# Patient Record
Sex: FEMALE | Race: WHITE | NOT HISPANIC OR LATINO | Employment: PART TIME | URBAN - NONMETROPOLITAN AREA
[De-identification: names, ages, dates, MRNs, and addresses within clinical notes are randomized per-mention and may not be internally consistent; named-entity substitution may affect disease eponyms.]

---

## 2017-05-02 ENCOUNTER — APPOINTMENT (OUTPATIENT)
Dept: LAB | Facility: HOSPITAL | Age: 32
End: 2017-05-02

## 2017-05-02 ENCOUNTER — OFFICE VISIT (OUTPATIENT)
Dept: OBSTETRICS AND GYNECOLOGY | Facility: CLINIC | Age: 32
End: 2017-05-02

## 2017-05-02 VITALS
WEIGHT: 148 LBS | SYSTOLIC BLOOD PRESSURE: 129 MMHG | DIASTOLIC BLOOD PRESSURE: 76 MMHG | HEIGHT: 62 IN | BODY MASS INDEX: 27.23 KG/M2 | HEART RATE: 72 BPM

## 2017-05-02 DIAGNOSIS — Z01.411 ENCOUNTER FOR GYNECOLOGICAL EXAMINATION WITH ABNORMAL FINDING: Primary | ICD-10-CM

## 2017-05-02 DIAGNOSIS — N91.5 OLIGOMENORRHEA: ICD-10-CM

## 2017-05-02 DIAGNOSIS — Z31.69 GENERAL COUNSELING AND ADVICE FOR PROCREATIVE MANAGEMENT: ICD-10-CM

## 2017-05-02 LAB
ALBUMIN SERPL-MCNC: 4.3 G/DL (ref 3.4–4.8)
ALBUMIN/GLOB SERPL: 1.4 G/DL (ref 1.1–1.8)
ALP SERPL-CCNC: 98 U/L (ref 38–126)
ALT SERPL W P-5'-P-CCNC: 32 U/L (ref 9–52)
ANION GAP SERPL CALCULATED.3IONS-SCNC: 12 MMOL/L (ref 5–15)
AST SERPL-CCNC: 23 U/L (ref 14–36)
BILIRUB SERPL-MCNC: 0.4 MG/DL (ref 0.2–1.3)
BUN BLD-MCNC: 13 MG/DL (ref 7–21)
BUN/CREAT SERPL: 19.7 (ref 7–25)
CALCIUM SPEC-SCNC: 9.2 MG/DL (ref 8.4–10.2)
CHLORIDE SERPL-SCNC: 103 MMOL/L (ref 95–110)
CO2 SERPL-SCNC: 24 MMOL/L (ref 22–31)
CREAT BLD-MCNC: 0.66 MG/DL (ref 0.5–1)
DEPRECATED RDW RBC AUTO: 38.5 FL (ref 36.4–46.3)
ERYTHROCYTE [DISTWIDTH] IN BLOOD BY AUTOMATED COUNT: 12.7 % (ref 11.5–14.5)
FSH SERPL-ACNC: 4.9 MIU/ML
GFR SERPL CREATININE-BSD FRML MDRD: 104 ML/MIN/1.73 (ref 64–149)
GLOBULIN UR ELPH-MCNC: 3.1 GM/DL (ref 2.3–3.5)
GLUCOSE BLD-MCNC: 92 MG/DL (ref 60–100)
HBA1C MFR BLD: 5.69 % (ref 4–5.6)
HCT VFR BLD AUTO: 41.3 % (ref 35–45)
HGB BLD-MCNC: 14.2 G/DL (ref 12–15.5)
LH SERPL-ACNC: 4.79 MIU/ML
MCH RBC QN AUTO: 28.9 PG (ref 26.5–34)
MCHC RBC AUTO-ENTMCNC: 34.4 G/DL (ref 31.4–36)
MCV RBC AUTO: 84.1 FL (ref 80–98)
PLATELET # BLD AUTO: 317 10*3/MM3 (ref 150–450)
PMV BLD AUTO: 10.2 FL (ref 8–12)
POTASSIUM BLD-SCNC: 4 MMOL/L (ref 3.5–5.1)
PROT SERPL-MCNC: 7.4 G/DL (ref 6.3–8.6)
RBC # BLD AUTO: 4.91 10*6/MM3 (ref 3.77–5.16)
SODIUM BLD-SCNC: 139 MMOL/L (ref 137–145)
T3 SERPL-MCNC: 151 NG/DL (ref 97–169)
T4 FREE SERPL-MCNC: 0.95 NG/DL (ref 0.78–2.19)
TSH SERPL DL<=0.05 MIU/L-ACNC: 1.2 MIU/ML (ref 0.46–4.68)
WBC NRBC COR # BLD: 9.19 10*3/MM3 (ref 3.2–9.8)

## 2017-05-02 PROCEDURE — 83036 HEMOGLOBIN GLYCOSYLATED A1C: CPT | Performed by: NURSE PRACTITIONER

## 2017-05-02 PROCEDURE — 84144 ASSAY OF PROGESTERONE: CPT | Performed by: NURSE PRACTITIONER

## 2017-05-02 PROCEDURE — 36415 COLL VENOUS BLD VENIPUNCTURE: CPT | Performed by: NURSE PRACTITIONER

## 2017-05-02 PROCEDURE — 83525 ASSAY OF INSULIN: CPT | Performed by: NURSE PRACTITIONER

## 2017-05-02 PROCEDURE — 80053 COMPREHEN METABOLIC PANEL: CPT | Performed by: NURSE PRACTITIONER

## 2017-05-02 PROCEDURE — 84443 ASSAY THYROID STIM HORMONE: CPT | Performed by: NURSE PRACTITIONER

## 2017-05-02 PROCEDURE — 84480 ASSAY TRIIODOTHYRONINE (T3): CPT | Performed by: NURSE PRACTITIONER

## 2017-05-02 PROCEDURE — 87624 HPV HI-RISK TYP POOLED RSLT: CPT | Performed by: NURSE PRACTITIONER

## 2017-05-02 PROCEDURE — 85027 COMPLETE CBC AUTOMATED: CPT | Performed by: NURSE PRACTITIONER

## 2017-05-02 PROCEDURE — 84439 ASSAY OF FREE THYROXINE: CPT | Performed by: NURSE PRACTITIONER

## 2017-05-02 PROCEDURE — 83002 ASSAY OF GONADOTROPIN (LH): CPT | Performed by: NURSE PRACTITIONER

## 2017-05-02 PROCEDURE — 99385 PREV VISIT NEW AGE 18-39: CPT | Performed by: NURSE PRACTITIONER

## 2017-05-02 PROCEDURE — 88142 CYTOPATH C/V THIN LAYER: CPT | Performed by: NURSE PRACTITIONER

## 2017-05-02 PROCEDURE — 83001 ASSAY OF GONADOTROPIN (FSH): CPT | Performed by: NURSE PRACTITIONER

## 2017-05-02 RX ORDER — METFORMIN HYDROCHLORIDE 500 MG/1
500 TABLET, EXTENDED RELEASE ORAL
Qty: 30 TABLET | Refills: 6 | Status: SHIPPED | OUTPATIENT
Start: 2017-05-02 | End: 2017-06-08 | Stop reason: SDUPTHER

## 2017-05-03 ENCOUNTER — TELEPHONE (OUTPATIENT)
Dept: OBSTETRICS AND GYNECOLOGY | Facility: CLINIC | Age: 32
End: 2017-05-03

## 2017-05-03 LAB
INSULIN SERPL-ACNC: 11.6 UIU/ML (ref 2.6–24.9)
PROGEST SERPL-MCNC: <0.1 NG/ML

## 2017-05-03 RX ORDER — MEDROXYPROGESTERONE ACETATE 2.5 MG/1
TABLET ORAL
Qty: 12 TABLET | Refills: 3 | Status: SHIPPED | OUTPATIENT
Start: 2017-05-03 | End: 2020-03-03

## 2017-05-03 RX ORDER — MEDROXYPROGESTERONE ACETATE 10 MG/1
10 TABLET ORAL DAILY
Qty: 10 TABLET | Refills: 3 | Status: SHIPPED | OUTPATIENT
Start: 2017-05-03 | End: 2020-03-03

## 2017-05-05 LAB
LAB AP CASE REPORT: NORMAL
LAB AP GYN ADDITIONAL INFORMATION: NORMAL
LAB AP GYN OTHER FINDINGS: NORMAL
Lab: NORMAL
PATH INTERP SPEC-IMP: NORMAL
STAT OF ADQ CVX/VAG CYTO-IMP: NORMAL

## 2017-05-09 LAB — HPV I/H RISK 4 DNA CVX QL PROBE+SIG AMP: NEGATIVE

## 2017-06-08 RX ORDER — METFORMIN HYDROCHLORIDE 500 MG/1
500 TABLET, EXTENDED RELEASE ORAL 2 TIMES DAILY
Qty: 60 TABLET | Refills: 6 | Status: SHIPPED | OUTPATIENT
Start: 2017-06-08 | End: 2020-03-03

## 2017-06-30 RX ORDER — BENZONATATE 100 MG/1
100 CAPSULE ORAL 3 TIMES DAILY PRN
Qty: 30 CAPSULE | Refills: 0 | Status: SHIPPED | OUTPATIENT
Start: 2017-06-30 | End: 2017-08-01

## 2017-08-01 ENCOUNTER — OFFICE VISIT (OUTPATIENT)
Dept: FAMILY MEDICINE CLINIC | Facility: CLINIC | Age: 32
End: 2017-08-01

## 2017-08-01 VITALS
OXYGEN SATURATION: 98 % | RESPIRATION RATE: 16 BRPM | HEART RATE: 78 BPM | TEMPERATURE: 98.9 F | DIASTOLIC BLOOD PRESSURE: 78 MMHG | WEIGHT: 145 LBS | SYSTOLIC BLOOD PRESSURE: 118 MMHG | HEIGHT: 62 IN | BODY MASS INDEX: 26.68 KG/M2

## 2017-08-01 DIAGNOSIS — Z00.00 ROUTINE MEDICAL EXAM: Primary | ICD-10-CM

## 2017-08-01 PROCEDURE — 99395 PREV VISIT EST AGE 18-39: CPT | Performed by: NURSE PRACTITIONER

## 2017-08-01 NOTE — PROGRESS NOTES
Subjective   Rachel Salgado is a 32 y.o. female. Patient here today with complaints of School Physical  pt here today for college physical, denies complaints, recently had TB skin test which was negative and within the last year has had MMR, varicella, hep b, cbc and ua labs.     Vitals:    08/01/17 1041   BP: 118/78   Pulse: 78   Resp: 16   Temp: 98.9 °F (37.2 °C)   SpO2: 98%     Past Medical History:   Diagnosis Date   • Abnormal glucose tolerance test during pregnancy, not yet delivered     baby not yet delivered   • Absence of menstruation    • Cholestasis of parenteral nutrition    • Dietary counseling and surveillance    • Encounter for routine gynecological examination    • Need for prophylactic vaccination and inoculation against influenza    • Routine postpartum follow-up    • Tuberculosis screening    • Vaginitis and vulvovaginitis    • Visit for gynecologic examination      History of Present Illness     The following portions of the patient's history were reviewed and updated as appropriate: allergies, current medications, past family history, past medical history, past social history, past surgical history and problem list.    Review of Systems   Constitutional: Negative.    HENT: Negative.    Eyes: Negative.    Respiratory: Negative.    Cardiovascular: Negative.    Gastrointestinal: Negative.    Endocrine: Negative.    Genitourinary: Negative.    Musculoskeletal: Negative.    Skin: Negative.    Allergic/Immunologic: Negative.    Neurological: Negative.    Hematological: Negative.    Psychiatric/Behavioral: Negative.        Objective   Physical Exam   Constitutional: She is oriented to person, place, and time. Vital signs are normal. She appears well-developed and well-nourished. No distress.   HENT:   Head: Normocephalic and atraumatic.   Right Ear: External ear normal.   Left Ear: External ear normal.   Nose: Nose normal.   Mouth/Throat: Oropharynx is clear and moist. No oropharyngeal exudate.    Eyes: Conjunctivae and EOM are normal. Pupils are equal, round, and reactive to light. Right eye exhibits no discharge. Left eye exhibits no discharge. No scleral icterus.   Neck: Normal range of motion. Neck supple. No JVD present. No tracheal deviation present. No thyromegaly present.   Cardiovascular: Normal rate, regular rhythm, normal heart sounds and intact distal pulses.  Exam reveals no gallop and no friction rub.    No murmur heard.  Pulmonary/Chest: Effort normal and breath sounds normal. No stridor. No respiratory distress. She has no wheezes. She has no rales. She exhibits no tenderness.   Abdominal: Soft. Bowel sounds are normal. She exhibits no distension and no mass. There is no tenderness. There is no rebound and no guarding. No hernia.   Musculoskeletal: Normal range of motion. She exhibits no edema, tenderness or deformity.   Lymphadenopathy:     She has no cervical adenopathy.   Neurological: She is alert and oriented to person, place, and time. She has normal reflexes. She displays normal reflexes. No cranial nerve deficit. She exhibits normal muscle tone. Coordination normal.   Skin: Skin is warm and dry. No rash noted. She is not diaphoretic. No erythema. No pallor.   Psychiatric: She has a normal mood and affect. Her behavior is normal. Judgment and thought content normal.   Nursing note and vitals reviewed.      Assessment/Plan   Rachel was seen today for school physical.    Diagnoses and all orders for this visit:    Routine medical exam      Physical forms are completed, please copy of them in chart.  She is clear without restrictions to participate with her college classes.  Will return here when necessary problems or as scheduled for chronic conditions.  All questions and concerns are addressed with understanding noted. The patient is in agreement to above plan.

## 2017-08-29 ENCOUNTER — TELEPHONE (OUTPATIENT)
Dept: OBSTETRICS AND GYNECOLOGY | Facility: CLINIC | Age: 32
End: 2017-08-29

## 2017-08-29 NOTE — TELEPHONE ENCOUNTER
----- Message from Almaz Buckley sent at 8/29/2017  9:00 AM CDT -----  Contact: [phone]  PATIENT IS NEEDING ON HER CLOMID 100MG. SHE USES CLINIC PHARMACY IN [place]

## 2020-03-03 ENCOUNTER — OFFICE VISIT (OUTPATIENT)
Dept: OBSTETRICS AND GYNECOLOGY | Facility: CLINIC | Age: 35
End: 2020-03-03

## 2020-03-03 VITALS
DIASTOLIC BLOOD PRESSURE: 78 MMHG | HEART RATE: 77 BPM | HEIGHT: 62 IN | WEIGHT: 142.8 LBS | SYSTOLIC BLOOD PRESSURE: 126 MMHG | BODY MASS INDEX: 26.28 KG/M2

## 2020-03-03 DIAGNOSIS — N92.1 MENORRHAGIA WITH IRREGULAR CYCLE: Primary | ICD-10-CM

## 2020-03-03 DIAGNOSIS — Z12.31 SCREENING MAMMOGRAM, ENCOUNTER FOR: ICD-10-CM

## 2020-03-03 PROCEDURE — 99214 OFFICE O/P EST MOD 30 MIN: CPT | Performed by: NURSE PRACTITIONER

## 2020-03-03 RX ORDER — ONDANSETRON 4 MG/1
TABLET, ORALLY DISINTEGRATING ORAL
COMMUNITY
Start: 2020-02-07 | End: 2020-07-15

## 2020-03-04 NOTE — PROGRESS NOTES
"Subjective   Rahcel Salgado is a 34 y.o. female.     History of Present Illness   Pt presents with complaints of heavy, prolonged period. Pt has PCOS and often has amenorrhea for prolonged periods of time. She has failed numerous fertility measures and was told without a donor egg or embryo, there is not really a chance for her to conceive. Pt had bleeding in Oct (4 days, light to moderate, no cramping) and no bleeding again until 2/8/2020. She continues to bleed now but admits that it is lighter in the last couple of days. The first couple of weeks was excessive, \"pouring\". She denies any pelvic pain. Did pass clots when she was bleeding heavy. She has felt tired but denies palpitations, cold intolerance, SOA.     The following portions of the patient's history were reviewed and updated as appropriate: allergies, current medications, past family history, past medical history, past social history, past surgical history and problem list.    Review of Systems   Constitutional: Positive for fatigue. Negative for chills and fever.   Respiratory: Negative.  Negative for shortness of breath.    Cardiovascular: Negative.  Negative for palpitations.   Endocrine: Negative for cold intolerance and heat intolerance.   Genitourinary: Positive for menstrual problem. Negative for pelvic pain.   Neurological: Negative for dizziness, syncope and light-headedness.       Objective    Vitals:    03/03/20 1318   BP: 126/78   Pulse: 77         03/03/20  1318   Weight: 64.8 kg (142 lb 12.8 oz)     Body mass index is 26.12 kg/m².    Physical Exam   Constitutional: She is oriented to person, place, and time. She appears well-developed and well-nourished.   Cardiovascular: Normal rate, regular rhythm and normal heart sounds.   Pulmonary/Chest: Effort normal and breath sounds normal.   Neurological: She is alert and oriented to person, place, and time.   Skin: Skin is warm and dry. No pallor.   Psychiatric: She has a normal mood and " affect. Her behavior is normal.   Vitals reviewed.        Assessment/Plan   Rachel was seen today for menometrorrhagia.    Diagnoses and all orders for this visit:    Menorrhagia with irregular cycle  -     TSH  -     T4, Free  -     CBC Auto Differential  -     Iron Profile  -     Comprehensive Metabolic Panel  -     Vitamin B12  -     Vitamin D 25 Hydroxy    Screening mammogram, encounter for  -     Mammo Screening Digital Tomosynthesis Bilateral With CAD; Future    Obtain the labs listed above. Pt also interested in baseline screening MMG now that she is 35yo. I will place the order and pt to schedule this at a later date.     We discussed options of longer term mananagement with hormonal contraceptives, IUDs, etc. Vs short term management with a month or 2 of OCPs. Pt chooses 1 month of OCP then to monitor her bleeding pattern. I gave her a sample pack of Taytulla 1/20's. She has had to take provera for menses induction before. She agrees to taking provera every 3 months if she doesn't have a period on her own and UPT is negative. RTC in 3 months or sooner PRN. We will do a complete well woman in 3 months and follow up after a couple of months without OCP. We can discuss longer term options at that visit as well. Pt agrees with this plan of care.

## 2020-03-05 LAB
25(OH)D3 SERPL-MCNC: 32.5 NG/ML (ref 30–100)
ALBUMIN SERPL-MCNC: 4.3 G/DL (ref 3.5–5)
ALBUMIN/GLOB SERPL: 1.4 G/DL (ref 1.1–1.8)
ALP SERPL-CCNC: 87 U/L (ref 38–126)
ALT SERPL W P-5'-P-CCNC: 14 U/L
ANION GAP SERPL CALCULATED.3IONS-SCNC: 11 MMOL/L (ref 5–15)
AST SERPL-CCNC: 21 U/L (ref 14–36)
BASOPHILS # BLD AUTO: 0.01 10*3/MM3 (ref 0–0.2)
BASOPHILS NFR BLD AUTO: 0.2 % (ref 0–1.5)
BILIRUB SERPL-MCNC: 0.4 MG/DL (ref 0.2–1.3)
BUN BLD-MCNC: 14 MG/DL (ref 7–23)
BUN/CREAT SERPL: 17.3 (ref 7–25)
CALCIUM SPEC-SCNC: 9.3 MG/DL (ref 8.4–10.2)
CHLORIDE SERPL-SCNC: 106 MMOL/L (ref 101–112)
CO2 SERPL-SCNC: 26 MMOL/L (ref 22–30)
CREAT BLD-MCNC: 0.81 MG/DL (ref 0.52–1.04)
DEPRECATED RDW RBC AUTO: 41.4 FL (ref 37–54)
EOSINOPHIL # BLD AUTO: 0.21 10*3/MM3 (ref 0–0.4)
EOSINOPHIL NFR BLD AUTO: 3.2 % (ref 0.3–6.2)
ERYTHROCYTE [DISTWIDTH] IN BLOOD BY AUTOMATED COUNT: 12.8 % (ref 12.3–15.4)
GFR SERPL CREATININE-BSD FRML MDRD: 80 ML/MIN/1.73 (ref 64–149)
GLOBULIN UR ELPH-MCNC: 3.1 GM/DL (ref 2.3–3.5)
GLUCOSE BLD-MCNC: 86 MG/DL (ref 70–99)
HCT VFR BLD AUTO: 40.9 % (ref 34–46.6)
HGB BLD-MCNC: 13.3 G/DL (ref 12–15.9)
IRON 24H UR-MRATE: 124 MCG/DL (ref 37–145)
IRON SATN MFR SERPL: 34 % (ref 20–50)
LYMPHOCYTES # BLD AUTO: 1.99 10*3/MM3 (ref 0.7–3.1)
LYMPHOCYTES NFR BLD AUTO: 30.1 % (ref 19.6–45.3)
MCH RBC QN AUTO: 29.2 PG (ref 26.6–33)
MCHC RBC AUTO-ENTMCNC: 32.5 G/DL (ref 31.5–35.7)
MCV RBC AUTO: 89.7 FL (ref 79–97)
MONOCYTES # BLD AUTO: 0.4 10*3/MM3 (ref 0.1–0.9)
MONOCYTES NFR BLD AUTO: 6.1 % (ref 5–12)
NEUTROPHILS # BLD AUTO: 4 10*3/MM3 (ref 1.7–7)
NEUTROPHILS NFR BLD AUTO: 60.4 % (ref 42.7–76)
PLATELET # BLD AUTO: 287 10*3/MM3 (ref 140–450)
PMV BLD AUTO: 10 FL (ref 6–12)
POTASSIUM BLD-SCNC: 4 MMOL/L (ref 3.4–5)
PROT SERPL-MCNC: 7.4 G/DL (ref 6.3–8.6)
RBC # BLD AUTO: 4.56 10*6/MM3 (ref 3.77–5.28)
SODIUM BLD-SCNC: 143 MMOL/L (ref 137–145)
T4 FREE SERPL-MCNC: 1.14 NG/DL (ref 0.93–1.7)
TIBC SERPL-MCNC: 365 MCG/DL (ref 298–536)
TRANSFERRIN SERPL-MCNC: 245 MG/DL (ref 200–360)
TSH SERPL DL<=0.05 MIU/L-ACNC: 1.81 UIU/ML (ref 0.27–4.2)
VIT B12 BLD-MCNC: 681 PG/ML (ref 211–946)
WBC NRBC COR # BLD: 6.61 10*3/MM3 (ref 3.4–10.8)

## 2020-03-05 PROCEDURE — 84443 ASSAY THYROID STIM HORMONE: CPT | Performed by: NURSE PRACTITIONER

## 2020-03-05 PROCEDURE — 85025 COMPLETE CBC W/AUTO DIFF WBC: CPT | Performed by: NURSE PRACTITIONER

## 2020-03-05 PROCEDURE — 80053 COMPREHEN METABOLIC PANEL: CPT | Performed by: NURSE PRACTITIONER

## 2020-03-05 PROCEDURE — 83540 ASSAY OF IRON: CPT | Performed by: NURSE PRACTITIONER

## 2020-03-05 PROCEDURE — 36415 COLL VENOUS BLD VENIPUNCTURE: CPT | Performed by: NURSE PRACTITIONER

## 2020-03-05 PROCEDURE — 82306 VITAMIN D 25 HYDROXY: CPT | Performed by: NURSE PRACTITIONER

## 2020-03-05 PROCEDURE — 84466 ASSAY OF TRANSFERRIN: CPT | Performed by: NURSE PRACTITIONER

## 2020-03-05 PROCEDURE — 82607 VITAMIN B-12: CPT | Performed by: NURSE PRACTITIONER

## 2020-03-05 PROCEDURE — 84439 ASSAY OF FREE THYROXINE: CPT | Performed by: NURSE PRACTITIONER

## 2020-05-10 ENCOUNTER — APPOINTMENT (OUTPATIENT)
Dept: ULTRASOUND IMAGING | Facility: HOSPITAL | Age: 35
End: 2020-05-10

## 2020-05-10 ENCOUNTER — HOSPITAL ENCOUNTER (OUTPATIENT)
Facility: HOSPITAL | Age: 35
Discharge: HOME | End: 2020-05-11
Attending: EMERGENCY MEDICINE | Admitting: SURGERY

## 2020-05-10 DIAGNOSIS — K81.0 ACUTE CHOLECYSTITIS: Primary | ICD-10-CM

## 2020-05-10 LAB
ALBUMIN SERPL-MCNC: 4.5 G/DL (ref 3.5–5.2)
ALBUMIN/GLOB SERPL: 1.4 G/DL
ALP SERPL-CCNC: 101 U/L (ref 39–117)
ALT SERPL W P-5'-P-CCNC: 15 U/L (ref 1–33)
ANION GAP SERPL CALCULATED.3IONS-SCNC: 14 MMOL/L (ref 5–15)
AST SERPL-CCNC: 20 U/L (ref 1–32)
B-HCG UR QL: NEGATIVE
BACTERIA UR QL AUTO: ABNORMAL /HPF
BASOPHILS # BLD AUTO: 0.02 10*3/MM3 (ref 0–0.2)
BASOPHILS NFR BLD AUTO: 0.1 % (ref 0–1.5)
BILIRUB SERPL-MCNC: 0.2 MG/DL (ref 0.2–1.2)
BILIRUB UR QL STRIP: NEGATIVE
BUN BLD-MCNC: 13 MG/DL (ref 6–20)
BUN/CREAT SERPL: 15.9 (ref 7–25)
CALCIUM SPEC-SCNC: 9.5 MG/DL (ref 8.6–10.5)
CHLORIDE SERPL-SCNC: 104 MMOL/L (ref 98–107)
CLARITY UR: CLEAR
CO2 SERPL-SCNC: 22 MMOL/L (ref 22–29)
COLOR UR: YELLOW
CREAT BLD-MCNC: 0.82 MG/DL (ref 0.57–1)
DEPRECATED RDW RBC AUTO: 36.5 FL (ref 37–54)
EOSINOPHIL # BLD AUTO: 0.22 10*3/MM3 (ref 0–0.4)
EOSINOPHIL NFR BLD AUTO: 1.6 % (ref 0.3–6.2)
ERYTHROCYTE [DISTWIDTH] IN BLOOD BY AUTOMATED COUNT: 12 % (ref 12.3–15.4)
GFR SERPL CREATININE-BSD FRML MDRD: 79 ML/MIN/1.73
GLOBULIN UR ELPH-MCNC: 3.2 GM/DL
GLUCOSE BLD-MCNC: 124 MG/DL (ref 65–99)
GLUCOSE UR STRIP-MCNC: NEGATIVE MG/DL
HCT VFR BLD AUTO: 42.1 % (ref 34–46.6)
HGB BLD-MCNC: 14.3 G/DL (ref 12–15.9)
HGB UR QL STRIP.AUTO: NEGATIVE
HOLD SPECIMEN: NORMAL
HYALINE CASTS UR QL AUTO: ABNORMAL /LPF
IMM GRANULOCYTES # BLD AUTO: 0.05 10*3/MM3 (ref 0–0.05)
IMM GRANULOCYTES NFR BLD AUTO: 0.4 % (ref 0–0.5)
KETONES UR QL STRIP: NEGATIVE
LEUKOCYTE ESTERASE UR QL STRIP.AUTO: ABNORMAL
LIPASE SERPL-CCNC: 22 U/L (ref 13–60)
LYMPHOCYTES # BLD AUTO: 2.66 10*3/MM3 (ref 0.7–3.1)
LYMPHOCYTES NFR BLD AUTO: 19.4 % (ref 19.6–45.3)
MCH RBC QN AUTO: 28.5 PG (ref 26.6–33)
MCHC RBC AUTO-ENTMCNC: 34 G/DL (ref 31.5–35.7)
MCV RBC AUTO: 84 FL (ref 79–97)
MONOCYTES # BLD AUTO: 1.13 10*3/MM3 (ref 0.1–0.9)
MONOCYTES NFR BLD AUTO: 8.2 % (ref 5–12)
NEUTROPHILS # BLD AUTO: 9.65 10*3/MM3 (ref 1.7–7)
NEUTROPHILS NFR BLD AUTO: 70.3 % (ref 42.7–76)
NITRITE UR QL STRIP: NEGATIVE
NRBC BLD AUTO-RTO: 0 /100 WBC (ref 0–0.2)
PH UR STRIP.AUTO: 5.5 [PH] (ref 5–9)
PLATELET # BLD AUTO: 307 10*3/MM3 (ref 140–450)
PMV BLD AUTO: 10.3 FL (ref 6–12)
POTASSIUM BLD-SCNC: 4.1 MMOL/L (ref 3.5–5.2)
PROT SERPL-MCNC: 7.7 G/DL (ref 6–8.5)
PROT UR QL STRIP: NEGATIVE
RBC # BLD AUTO: 5.01 10*6/MM3 (ref 3.77–5.28)
RBC # UR: ABNORMAL /HPF
REF LAB TEST METHOD: ABNORMAL
SODIUM BLD-SCNC: 140 MMOL/L (ref 136–145)
SP GR UR STRIP: 1.02 (ref 1–1.03)
SQUAMOUS #/AREA URNS HPF: ABNORMAL /HPF
UROBILINOGEN UR QL STRIP: ABNORMAL
WBC NRBC COR # BLD: 13.73 10*3/MM3 (ref 3.4–10.8)
WBC UR QL AUTO: ABNORMAL /HPF
WHOLE BLOOD HOLD SPECIMEN: NORMAL

## 2020-05-10 PROCEDURE — 81025 URINE PREGNANCY TEST: CPT | Performed by: NURSE PRACTITIONER

## 2020-05-10 PROCEDURE — 96365 THER/PROPH/DIAG IV INF INIT: CPT

## 2020-05-10 PROCEDURE — 99284 EMERGENCY DEPT VISIT MOD MDM: CPT

## 2020-05-10 PROCEDURE — 85025 COMPLETE CBC W/AUTO DIFF WBC: CPT | Performed by: NURSE PRACTITIONER

## 2020-05-10 PROCEDURE — 25010000002 PIPERACILLIN-TAZOBACTAM: Performed by: NURSE PRACTITIONER

## 2020-05-10 PROCEDURE — 25010000002 KETOROLAC TROMETHAMINE PER 15 MG: Performed by: NURSE PRACTITIONER

## 2020-05-10 PROCEDURE — 83690 ASSAY OF LIPASE: CPT | Performed by: NURSE PRACTITIONER

## 2020-05-10 PROCEDURE — G0378 HOSPITAL OBSERVATION PER HR: HCPCS

## 2020-05-10 PROCEDURE — 96361 HYDRATE IV INFUSION ADD-ON: CPT

## 2020-05-10 PROCEDURE — 25010000002 ONDANSETRON PER 1 MG: Performed by: NURSE PRACTITIONER

## 2020-05-10 PROCEDURE — 96375 TX/PRO/DX INJ NEW DRUG ADDON: CPT

## 2020-05-10 PROCEDURE — 80053 COMPREHEN METABOLIC PANEL: CPT | Performed by: NURSE PRACTITIONER

## 2020-05-10 PROCEDURE — 76705 ECHO EXAM OF ABDOMEN: CPT

## 2020-05-10 PROCEDURE — 81001 URINALYSIS AUTO W/SCOPE: CPT | Performed by: EMERGENCY MEDICINE

## 2020-05-10 RX ORDER — ACETAMINOPHEN 650 MG/1
650 SUPPOSITORY RECTAL EVERY 4 HOURS PRN
Status: DISCONTINUED | OUTPATIENT
Start: 2020-05-10 | End: 2020-05-11 | Stop reason: HOSPADM

## 2020-05-10 RX ORDER — ACETAMINOPHEN 325 MG/1
650 TABLET ORAL EVERY 4 HOURS PRN
Status: DISCONTINUED | OUTPATIENT
Start: 2020-05-10 | End: 2020-05-11 | Stop reason: HOSPADM

## 2020-05-10 RX ORDER — SODIUM CHLORIDE 9 MG/ML
100 INJECTION, SOLUTION INTRAVENOUS CONTINUOUS
Status: DISCONTINUED | OUTPATIENT
Start: 2020-05-10 | End: 2020-05-11 | Stop reason: HOSPADM

## 2020-05-10 RX ORDER — ONDANSETRON 4 MG/1
4 TABLET, FILM COATED ORAL EVERY 6 HOURS PRN
Status: DISCONTINUED | OUTPATIENT
Start: 2020-05-10 | End: 2020-05-11 | Stop reason: HOSPADM

## 2020-05-10 RX ORDER — ACETAMINOPHEN 160 MG/5ML
650 SOLUTION ORAL EVERY 4 HOURS PRN
Status: DISCONTINUED | OUTPATIENT
Start: 2020-05-10 | End: 2020-05-10 | Stop reason: SDUPTHER

## 2020-05-10 RX ORDER — ONDANSETRON 2 MG/ML
4 INJECTION INTRAMUSCULAR; INTRAVENOUS EVERY 6 HOURS PRN
Status: DISCONTINUED | OUTPATIENT
Start: 2020-05-10 | End: 2020-05-11 | Stop reason: HOSPADM

## 2020-05-10 RX ORDER — SODIUM CHLORIDE 0.9 % (FLUSH) 0.9 %
10 SYRINGE (ML) INJECTION AS NEEDED
Status: DISCONTINUED | OUTPATIENT
Start: 2020-05-10 | End: 2020-05-11 | Stop reason: HOSPADM

## 2020-05-10 RX ORDER — ONDANSETRON 2 MG/ML
4 INJECTION INTRAMUSCULAR; INTRAVENOUS ONCE
Status: COMPLETED | OUTPATIENT
Start: 2020-05-10 | End: 2020-05-10

## 2020-05-10 RX ORDER — SODIUM CHLORIDE 0.9 % (FLUSH) 0.9 %
10 SYRINGE (ML) INJECTION EVERY 12 HOURS SCHEDULED
Status: DISCONTINUED | OUTPATIENT
Start: 2020-05-10 | End: 2020-05-11 | Stop reason: HOSPADM

## 2020-05-10 RX ORDER — KETOROLAC TROMETHAMINE 30 MG/ML
30 INJECTION, SOLUTION INTRAMUSCULAR; INTRAVENOUS ONCE
Status: COMPLETED | OUTPATIENT
Start: 2020-05-10 | End: 2020-05-10

## 2020-05-10 RX ORDER — NALOXONE HCL 0.4 MG/ML
0.4 VIAL (ML) INJECTION
Status: DISCONTINUED | OUTPATIENT
Start: 2020-05-10 | End: 2020-05-11 | Stop reason: HOSPADM

## 2020-05-10 RX ADMIN — ONDANSETRON 4 MG: 2 INJECTION INTRAMUSCULAR; INTRAVENOUS at 18:50

## 2020-05-10 RX ADMIN — SODIUM CHLORIDE, PRESERVATIVE FREE 10 ML: 5 INJECTION INTRAVENOUS at 23:01

## 2020-05-10 RX ADMIN — KETOROLAC TROMETHAMINE 30 MG: 30 INJECTION, SOLUTION INTRAMUSCULAR at 18:50

## 2020-05-10 RX ADMIN — PIPERACILLIN SODIUM,TAZOBACTAM SODIUM 3.38 G: 3; .375 INJECTION, POWDER, FOR SOLUTION INTRAVENOUS at 21:22

## 2020-05-10 RX ADMIN — SODIUM CHLORIDE 1000 ML: 900 INJECTION, SOLUTION INTRAVENOUS at 18:50

## 2020-05-10 RX ADMIN — SODIUM CHLORIDE 100 ML/HR: 9 INJECTION, SOLUTION INTRAVENOUS at 23:01

## 2020-05-11 ENCOUNTER — APPOINTMENT (OUTPATIENT)
Dept: GENERAL RADIOLOGY | Facility: HOSPITAL | Age: 35
End: 2020-05-11

## 2020-05-11 ENCOUNTER — READMISSION MANAGEMENT (OUTPATIENT)
Dept: CALL CENTER | Facility: HOSPITAL | Age: 35
End: 2020-05-11

## 2020-05-11 ENCOUNTER — ANESTHESIA (OUTPATIENT)
Dept: PERIOP | Facility: HOSPITAL | Age: 35
End: 2020-05-11

## 2020-05-11 ENCOUNTER — ANESTHESIA EVENT (OUTPATIENT)
Dept: PERIOP | Facility: HOSPITAL | Age: 35
End: 2020-05-11

## 2020-05-11 ENCOUNTER — PREP FOR SURGERY (OUTPATIENT)
Dept: OTHER | Facility: HOSPITAL | Age: 35
End: 2020-05-11

## 2020-05-11 VITALS
DIASTOLIC BLOOD PRESSURE: 79 MMHG | BODY MASS INDEX: 25.76 KG/M2 | OXYGEN SATURATION: 97 % | RESPIRATION RATE: 16 BRPM | HEIGHT: 62 IN | TEMPERATURE: 97.3 F | WEIGHT: 140 LBS | HEART RATE: 101 BPM | SYSTOLIC BLOOD PRESSURE: 123 MMHG

## 2020-05-11 DIAGNOSIS — K81.0 ACUTE CHOLECYSTITIS: Primary | ICD-10-CM

## 2020-05-11 LAB
ALBUMIN SERPL-MCNC: 3.6 G/DL (ref 3.5–5.2)
ALBUMIN/GLOB SERPL: 1.4 G/DL
ALP SERPL-CCNC: 88 U/L (ref 39–117)
ALT SERPL W P-5'-P-CCNC: 20 U/L (ref 1–33)
ANION GAP SERPL CALCULATED.3IONS-SCNC: 9 MMOL/L (ref 5–15)
AST SERPL-CCNC: 19 U/L (ref 1–32)
BASOPHILS # BLD AUTO: 0.03 10*3/MM3 (ref 0–0.2)
BASOPHILS NFR BLD AUTO: 0.3 % (ref 0–1.5)
BILIRUB SERPL-MCNC: 0.5 MG/DL (ref 0.2–1.2)
BUN BLD-MCNC: 12 MG/DL (ref 6–20)
BUN/CREAT SERPL: 15.8 (ref 7–25)
CALCIUM SPEC-SCNC: 8.5 MG/DL (ref 8.6–10.5)
CHLORIDE SERPL-SCNC: 107 MMOL/L (ref 98–107)
CO2 SERPL-SCNC: 24 MMOL/L (ref 22–29)
CREAT BLD-MCNC: 0.76 MG/DL (ref 0.57–1)
DEPRECATED RDW RBC AUTO: 38.2 FL (ref 37–54)
EOSINOPHIL # BLD AUTO: 0.29 10*3/MM3 (ref 0–0.4)
EOSINOPHIL NFR BLD AUTO: 3.2 % (ref 0.3–6.2)
ERYTHROCYTE [DISTWIDTH] IN BLOOD BY AUTOMATED COUNT: 12 % (ref 12.3–15.4)
GFR SERPL CREATININE-BSD FRML MDRD: 87 ML/MIN/1.73
GLOBULIN UR ELPH-MCNC: 2.6 GM/DL
GLUCOSE BLD-MCNC: 96 MG/DL (ref 65–99)
HCT VFR BLD AUTO: 37.2 % (ref 34–46.6)
HGB BLD-MCNC: 12.2 G/DL (ref 12–15.9)
IMM GRANULOCYTES # BLD AUTO: 0.03 10*3/MM3 (ref 0–0.05)
IMM GRANULOCYTES NFR BLD AUTO: 0.3 % (ref 0–0.5)
LIPASE SERPL-CCNC: 22 U/L (ref 13–60)
LYMPHOCYTES # BLD AUTO: 2.29 10*3/MM3 (ref 0.7–3.1)
LYMPHOCYTES NFR BLD AUTO: 25.3 % (ref 19.6–45.3)
MCH RBC QN AUTO: 28.3 PG (ref 26.6–33)
MCHC RBC AUTO-ENTMCNC: 32.8 G/DL (ref 31.5–35.7)
MCV RBC AUTO: 86.3 FL (ref 79–97)
MONOCYTES # BLD AUTO: 0.83 10*3/MM3 (ref 0.1–0.9)
MONOCYTES NFR BLD AUTO: 9.2 % (ref 5–12)
NEUTROPHILS # BLD AUTO: 5.58 10*3/MM3 (ref 1.7–7)
NEUTROPHILS NFR BLD AUTO: 61.7 % (ref 42.7–76)
NRBC BLD AUTO-RTO: 0 /100 WBC (ref 0–0.2)
PLATELET # BLD AUTO: 238 10*3/MM3 (ref 140–450)
PMV BLD AUTO: 10.3 FL (ref 6–12)
POTASSIUM BLD-SCNC: 4.1 MMOL/L (ref 3.5–5.2)
PROT SERPL-MCNC: 6.2 G/DL (ref 6–8.5)
RBC # BLD AUTO: 4.31 10*6/MM3 (ref 3.77–5.28)
SODIUM BLD-SCNC: 140 MMOL/L (ref 136–145)
WBC NRBC COR # BLD: 9.05 10*3/MM3 (ref 3.4–10.8)

## 2020-05-11 PROCEDURE — 25010000002 IOPAMIDOL 61 % SOLUTION: Performed by: SURGERY

## 2020-05-11 PROCEDURE — G0378 HOSPITAL OBSERVATION PER HR: HCPCS

## 2020-05-11 PROCEDURE — 47563 LAPARO CHOLECYSTECTOMY/GRAPH: CPT | Performed by: SURGERY

## 2020-05-11 PROCEDURE — 25010000002 ONDANSETRON PER 1 MG: Performed by: NURSE ANESTHETIST, CERTIFIED REGISTERED

## 2020-05-11 PROCEDURE — 83690 ASSAY OF LIPASE: CPT | Performed by: HOSPITALIST

## 2020-05-11 PROCEDURE — 25010000002 MIDAZOLAM PER 1 MG: Performed by: NURSE ANESTHETIST, CERTIFIED REGISTERED

## 2020-05-11 PROCEDURE — 25010000003 MEPERIDINE PER 100 MG: Performed by: NURSE ANESTHETIST, CERTIFIED REGISTERED

## 2020-05-11 PROCEDURE — 25010000002 FENTANYL CITRATE (PF) 100 MCG/2ML SOLUTION: Performed by: NURSE ANESTHETIST, CERTIFIED REGISTERED

## 2020-05-11 PROCEDURE — 25010000002 DEXAMETHASONE PER 1 MG: Performed by: NURSE ANESTHETIST, CERTIFIED REGISTERED

## 2020-05-11 PROCEDURE — 96366 THER/PROPH/DIAG IV INF ADDON: CPT

## 2020-05-11 PROCEDURE — 88304 TISSUE EXAM BY PATHOLOGIST: CPT | Performed by: SURGERY

## 2020-05-11 PROCEDURE — 25010000002 HYDROMORPHONE PER 4 MG: Performed by: NURSE ANESTHETIST, CERTIFIED REGISTERED

## 2020-05-11 PROCEDURE — 25010000002 SUCCINYLCHOLINE PER 20 MG: Performed by: NURSE ANESTHETIST, CERTIFIED REGISTERED

## 2020-05-11 PROCEDURE — 76000 FLUOROSCOPY <1 HR PHYS/QHP: CPT

## 2020-05-11 PROCEDURE — 74300 X-RAY BILE DUCTS/PANCREAS: CPT | Performed by: SURGERY

## 2020-05-11 PROCEDURE — 25010000002 NEOSTIGMINE 4 MG/4ML SOLUTION PREFILLED SYRINGE: Performed by: NURSE ANESTHETIST, CERTIFIED REGISTERED

## 2020-05-11 PROCEDURE — 96361 HYDRATE IV INFUSION ADD-ON: CPT

## 2020-05-11 PROCEDURE — 25010000002 ONDANSETRON PER 1 MG: Performed by: SURGERY

## 2020-05-11 PROCEDURE — 85025 COMPLETE CBC W/AUTO DIFF WBC: CPT | Performed by: HOSPITALIST

## 2020-05-11 PROCEDURE — 88304 TISSUE EXAM BY PATHOLOGIST: CPT | Performed by: PATHOLOGY

## 2020-05-11 PROCEDURE — 25010000002 PIPERACILLIN-TAZOBACTAM: Performed by: HOSPITALIST

## 2020-05-11 PROCEDURE — 80053 COMPREHEN METABOLIC PANEL: CPT | Performed by: HOSPITALIST

## 2020-05-11 PROCEDURE — 25010000002 PROPOFOL 10 MG/ML EMULSION: Performed by: NURSE ANESTHETIST, CERTIFIED REGISTERED

## 2020-05-11 DEVICE — LIGAMAX 5 MM ENDOSCOPIC MULTIPLE CLIP APPLIER
Type: IMPLANTABLE DEVICE | Status: FUNCTIONAL
Brand: LIGAMAX

## 2020-05-11 RX ORDER — SODIUM CHLORIDE 0.9 % (FLUSH) 0.9 %
3 SYRINGE (ML) INJECTION EVERY 12 HOURS SCHEDULED
Status: CANCELLED | OUTPATIENT
Start: 2020-05-11

## 2020-05-11 RX ORDER — DIPHENHYDRAMINE HYDROCHLORIDE 50 MG/ML
12.5 INJECTION INTRAMUSCULAR; INTRAVENOUS
Status: DISCONTINUED | OUTPATIENT
Start: 2020-05-11 | End: 2020-05-11 | Stop reason: HOSPADM

## 2020-05-11 RX ORDER — ONDANSETRON 2 MG/ML
INJECTION INTRAMUSCULAR; INTRAVENOUS AS NEEDED
Status: DISCONTINUED | OUTPATIENT
Start: 2020-05-11 | End: 2020-05-11 | Stop reason: SURG

## 2020-05-11 RX ORDER — HYDROCODONE BITARTRATE AND ACETAMINOPHEN 7.5; 325 MG/1; MG/1
1 TABLET ORAL EVERY 6 HOURS PRN
Qty: 12 TABLET | Refills: 0 | Status: SHIPPED | OUTPATIENT
Start: 2020-05-11 | End: 2020-07-15

## 2020-05-11 RX ORDER — PROMETHAZINE HYDROCHLORIDE 25 MG/ML
6.25 INJECTION, SOLUTION INTRAMUSCULAR; INTRAVENOUS ONCE AS NEEDED
Status: DISCONTINUED | OUTPATIENT
Start: 2020-05-11 | End: 2020-05-11 | Stop reason: HOSPADM

## 2020-05-11 RX ORDER — ONDANSETRON 2 MG/ML
4 INJECTION INTRAMUSCULAR; INTRAVENOUS ONCE AS NEEDED
Status: COMPLETED | OUTPATIENT
Start: 2020-05-11 | End: 2020-05-11

## 2020-05-11 RX ORDER — SODIUM CHLORIDE 0.9 % (FLUSH) 0.9 %
10 SYRINGE (ML) INJECTION AS NEEDED
Status: DISCONTINUED | OUTPATIENT
Start: 2020-05-11 | End: 2020-05-11 | Stop reason: HOSPADM

## 2020-05-11 RX ORDER — ROCURONIUM BROMIDE 10 MG/ML
INJECTION, SOLUTION INTRAVENOUS AS NEEDED
Status: DISCONTINUED | OUTPATIENT
Start: 2020-05-11 | End: 2020-05-11 | Stop reason: SURG

## 2020-05-11 RX ORDER — LIDOCAINE HYDROCHLORIDE 20 MG/ML
INJECTION, SOLUTION INFILTRATION; PERINEURAL AS NEEDED
Status: DISCONTINUED | OUTPATIENT
Start: 2020-05-11 | End: 2020-05-11 | Stop reason: SURG

## 2020-05-11 RX ORDER — SODIUM CHLORIDE 0.9 % (FLUSH) 0.9 %
3 SYRINGE (ML) INJECTION EVERY 12 HOURS SCHEDULED
Status: DISCONTINUED | OUTPATIENT
Start: 2020-05-11 | End: 2020-05-11 | Stop reason: HOSPADM

## 2020-05-11 RX ORDER — SODIUM CHLORIDE, SODIUM GLUCONATE, SODIUM ACETATE, POTASSIUM CHLORIDE, AND MAGNESIUM CHLORIDE 526; 502; 368; 37; 30 MG/100ML; MG/100ML; MG/100ML; MG/100ML; MG/100ML
INJECTION, SOLUTION INTRAVENOUS CONTINUOUS PRN
Status: DISCONTINUED | OUTPATIENT
Start: 2020-05-11 | End: 2020-05-11 | Stop reason: SURG

## 2020-05-11 RX ORDER — DEXAMETHASONE SODIUM PHOSPHATE 4 MG/ML
INJECTION, SOLUTION INTRA-ARTICULAR; INTRALESIONAL; INTRAMUSCULAR; INTRAVENOUS; SOFT TISSUE AS NEEDED
Status: DISCONTINUED | OUTPATIENT
Start: 2020-05-11 | End: 2020-05-11 | Stop reason: SURG

## 2020-05-11 RX ORDER — MIDAZOLAM HYDROCHLORIDE 1 MG/ML
INJECTION INTRAMUSCULAR; INTRAVENOUS AS NEEDED
Status: DISCONTINUED | OUTPATIENT
Start: 2020-05-11 | End: 2020-05-11 | Stop reason: SURG

## 2020-05-11 RX ORDER — PROPOFOL 10 MG/ML
VIAL (ML) INTRAVENOUS AS NEEDED
Status: DISCONTINUED | OUTPATIENT
Start: 2020-05-11 | End: 2020-05-11 | Stop reason: SURG

## 2020-05-11 RX ORDER — MEPERIDINE HYDROCHLORIDE 25 MG/ML
12.5 INJECTION INTRAMUSCULAR; INTRAVENOUS; SUBCUTANEOUS
Status: COMPLETED | OUTPATIENT
Start: 2020-05-11 | End: 2020-05-11

## 2020-05-11 RX ORDER — SODIUM CHLORIDE 0.9 % (FLUSH) 0.9 %
10 SYRINGE (ML) INJECTION AS NEEDED
Status: CANCELLED | OUTPATIENT
Start: 2020-05-11

## 2020-05-11 RX ORDER — NEOSTIGMINE METHYLSULFATE 4 MG/4 ML
SYRINGE (ML) INTRAVENOUS AS NEEDED
Status: DISCONTINUED | OUTPATIENT
Start: 2020-05-11 | End: 2020-05-11 | Stop reason: SURG

## 2020-05-11 RX ORDER — ACETAMINOPHEN 325 MG/1
650 TABLET ORAL ONCE AS NEEDED
Status: DISCONTINUED | OUTPATIENT
Start: 2020-05-11 | End: 2020-05-11 | Stop reason: HOSPADM

## 2020-05-11 RX ORDER — BUPIVACAINE HYDROCHLORIDE AND EPINEPHRINE 5; 5 MG/ML; UG/ML
INJECTION, SOLUTION EPIDURAL; INTRACAUDAL; PERINEURAL AS NEEDED
Status: DISCONTINUED | OUTPATIENT
Start: 2020-05-11 | End: 2020-05-11 | Stop reason: HOSPADM

## 2020-05-11 RX ORDER — FENTANYL CITRATE 50 UG/ML
INJECTION, SOLUTION INTRAMUSCULAR; INTRAVENOUS AS NEEDED
Status: DISCONTINUED | OUTPATIENT
Start: 2020-05-11 | End: 2020-05-11 | Stop reason: SURG

## 2020-05-11 RX ORDER — PROMETHAZINE HYDROCHLORIDE 25 MG/1
25 TABLET ORAL ONCE AS NEEDED
Status: DISCONTINUED | OUTPATIENT
Start: 2020-05-11 | End: 2020-05-11 | Stop reason: HOSPADM

## 2020-05-11 RX ORDER — HYDROMORPHONE HCL 110MG/55ML
PATIENT CONTROLLED ANALGESIA SYRINGE INTRAVENOUS AS NEEDED
Status: DISCONTINUED | OUTPATIENT
Start: 2020-05-11 | End: 2020-05-11 | Stop reason: SURG

## 2020-05-11 RX ORDER — ACETAMINOPHEN 650 MG/1
650 SUPPOSITORY RECTAL ONCE AS NEEDED
Status: DISCONTINUED | OUTPATIENT
Start: 2020-05-11 | End: 2020-05-11 | Stop reason: HOSPADM

## 2020-05-11 RX ORDER — NALOXONE HCL 0.4 MG/ML
0.4 VIAL (ML) INJECTION AS NEEDED
Status: DISCONTINUED | OUTPATIENT
Start: 2020-05-11 | End: 2020-05-11 | Stop reason: HOSPADM

## 2020-05-11 RX ORDER — SUCCINYLCHOLINE CHLORIDE 20 MG/ML
INJECTION INTRAMUSCULAR; INTRAVENOUS AS NEEDED
Status: DISCONTINUED | OUTPATIENT
Start: 2020-05-11 | End: 2020-05-11 | Stop reason: SURG

## 2020-05-11 RX ORDER — PROMETHAZINE HYDROCHLORIDE 25 MG/1
25 SUPPOSITORY RECTAL ONCE AS NEEDED
Status: DISCONTINUED | OUTPATIENT
Start: 2020-05-11 | End: 2020-05-11 | Stop reason: HOSPADM

## 2020-05-11 RX ADMIN — ROCURONIUM BROMIDE 30 MG: 10 INJECTION INTRAVENOUS at 09:01

## 2020-05-11 RX ADMIN — ONDANSETRON 4 MG: 2 INJECTION INTRAMUSCULAR; INTRAVENOUS at 10:39

## 2020-05-11 RX ADMIN — HYDROMORPHONE HYDROCHLORIDE 0.5 MG: 2 INJECTION INTRAMUSCULAR; INTRAVENOUS; SUBCUTANEOUS at 09:36

## 2020-05-11 RX ADMIN — SODIUM CHLORIDE, SODIUM GLUCONATE, SODIUM ACETATE, POTASSIUM CHLORIDE, AND MAGNESIUM CHLORIDE: 526; 502; 368; 37; 30 INJECTION, SOLUTION INTRAVENOUS at 08:40

## 2020-05-11 RX ADMIN — LIDOCAINE HYDROCHLORIDE 60 MG: 20 INJECTION, SOLUTION INFILTRATION; PERINEURAL at 08:47

## 2020-05-11 RX ADMIN — HYDROMORPHONE HYDROCHLORIDE 0.5 MG: 2 INJECTION INTRAMUSCULAR; INTRAVENOUS; SUBCUTANEOUS at 09:19

## 2020-05-11 RX ADMIN — PIPERACILLIN SODIUM,TAZOBACTAM SODIUM 3.38 G: 3; .375 INJECTION, POWDER, FOR SOLUTION INTRAVENOUS at 04:21

## 2020-05-11 RX ADMIN — SODIUM CHLORIDE, SODIUM GLUCONATE, SODIUM ACETATE, POTASSIUM CHLORIDE, AND MAGNESIUM CHLORIDE: 526; 502; 368; 37; 30 INJECTION, SOLUTION INTRAVENOUS at 10:02

## 2020-05-11 RX ADMIN — ONDANSETRON 4 MG: 2 INJECTION INTRAMUSCULAR; INTRAVENOUS at 10:06

## 2020-05-11 RX ADMIN — ONDANSETRON 4 MG: 2 INJECTION INTRAMUSCULAR; INTRAVENOUS at 17:56

## 2020-05-11 RX ADMIN — DEXAMETHASONE SODIUM PHOSPHATE 4 MG: 4 INJECTION, SOLUTION INTRAMUSCULAR; INTRAVENOUS at 08:53

## 2020-05-11 RX ADMIN — FENTANYL CITRATE 50 MCG: 50 INJECTION, SOLUTION INTRAMUSCULAR; INTRAVENOUS at 09:10

## 2020-05-11 RX ADMIN — GLYCOPYRROLATE 0.4 MG: 0.2 INJECTION, SOLUTION INTRAMUSCULAR; INTRAVITREAL at 10:06

## 2020-05-11 RX ADMIN — ROCURONIUM BROMIDE 10 MG: 10 INJECTION INTRAVENOUS at 08:47

## 2020-05-11 RX ADMIN — Medication 3 MG: at 10:06

## 2020-05-11 RX ADMIN — SUCCINYLCHOLINE CHLORIDE 80 MG: 20 INJECTION, SOLUTION INTRAMUSCULAR; INTRAVENOUS at 08:47

## 2020-05-11 RX ADMIN — FENTANYL CITRATE 50 MCG: 50 INJECTION, SOLUTION INTRAMUSCULAR; INTRAVENOUS at 08:42

## 2020-05-11 RX ADMIN — PROPOFOL 130 MG: 10 INJECTION, EMULSION INTRAVENOUS at 08:47

## 2020-05-11 RX ADMIN — MEPERIDINE HYDROCHLORIDE 12.5 MG: 25 INJECTION, SOLUTION INTRAMUSCULAR; INTRAVENOUS; SUBCUTANEOUS at 10:55

## 2020-05-11 RX ADMIN — MEPERIDINE HYDROCHLORIDE 12.5 MG: 25 INJECTION, SOLUTION INTRAMUSCULAR; INTRAVENOUS; SUBCUTANEOUS at 10:35

## 2020-05-11 RX ADMIN — MIDAZOLAM HYDROCHLORIDE 2 MG: 2 INJECTION, SOLUTION INTRAMUSCULAR; INTRAVENOUS at 08:38

## 2020-05-11 NOTE — ED PROVIDER NOTES
Subjective   Patient presents to the ER with complaints of right upper quadrant pain that radiates into her back that started at midnight last night.  She states it woke her up from sleep.  She has had nausea without vomiting.  She has used ibuprofen at home without any relief.  She states the pain is constant sharp and is squeezing.  She reports she does still does have her gallbladder          Review of Systems   Constitutional: Negative.    Respiratory: Negative.    Cardiovascular: Negative.    Gastrointestinal: Positive for abdominal pain and nausea. Negative for abdominal distention, constipation, diarrhea and vomiting.   Genitourinary: Negative.    Musculoskeletal: Positive for back pain.   Neurological: Negative.    Psychiatric/Behavioral: Negative.        Past Medical History:   Diagnosis Date   • Abnormal glucose tolerance test during pregnancy, not yet delivered     baby not yet delivered   • Absence of menstruation    • Dietary counseling and surveillance    • Encounter for routine gynecological examination    • Need for prophylactic vaccination and inoculation against influenza    • Routine postpartum follow-up    • Tuberculosis screening    • Vaginitis and vulvovaginitis    • Visit for gynecologic examination        No Known Allergies    Past Surgical History:   Procedure Laterality Date   • DIAGNOSTIC LAPAROSCOPY     • PAP SMEAR  03/08/2011     Negative        Family History   Problem Relation Age of Onset   • Breast cancer Other    • Diabetes Other    • Hypertension Other        Social History     Socioeconomic History   • Marital status:      Spouse name: Not on file   • Number of children: Not on file   • Years of education: Not on file   • Highest education level: Not on file   Tobacco Use   • Smoking status: Never Smoker   • Smokeless tobacco: Never Used   Substance and Sexual Activity   • Alcohol use: No   • Drug use: No   • Sexual activity: Yes     Partners: Male     Birth  "control/protection: None           Objective    /87 (BP Location: Left arm, Patient Position: Sitting)   Pulse 81   Temp 98.1 °F (36.7 °C) (Skin)   Resp 16   Ht 157.5 cm (62\")   Wt 63.5 kg (140 lb)   LMP 04/20/2020   SpO2 97%   BMI 25.61 kg/m²     Physical Exam   Constitutional: She is oriented to person, place, and time. She appears well-developed and well-nourished. No distress.   HENT:   Head: Normocephalic and atraumatic.   Neck: Normal range of motion. Neck supple.   Cardiovascular: Normal rate, regular rhythm, normal heart sounds and intact distal pulses.   No murmur heard.  Pulmonary/Chest: Effort normal and breath sounds normal. No respiratory distress. She has no wheezes.   Abdominal: Soft. Normal appearance and bowel sounds are normal. She exhibits no distension. There is tenderness in the right upper quadrant and epigastric area. There is guarding and positive Salvador's sign.   Musculoskeletal: Normal range of motion.   Neurological: She is alert and oriented to person, place, and time. Coordination normal.   Skin: Skin is warm and dry. Capillary refill takes less than 2 seconds.   Psychiatric: She has a normal mood and affect. Her behavior is normal. Judgment and thought content normal.   Nursing note and vitals reviewed.      Procedures  Results for orders placed or performed during the hospital encounter of 05/10/20   Urinalysis With Microscopic If Indicated (No Culture) - Urine, Clean Catch   Result Value Ref Range    Color, UA Yellow Yellow, Straw, Dark Yellow, Christi    Appearance, UA Clear Clear    pH, UA 5.5 5.0 - 9.0    Specific Gravity, UA 1.025 1.003 - 1.030    Glucose, UA Negative Negative    Ketones, UA Negative Negative    Bilirubin, UA Negative Negative    Blood, UA Negative Negative    Protein, UA Negative Negative    Leuk Esterase, UA Small (1+) (A) Negative    Nitrite, UA Negative Negative    Urobilinogen, UA 0.2 E.U./dL 0.2 - 1.0 E.U./dL   Comprehensive Metabolic Panel "   Result Value Ref Range    Glucose 124 (H) 65 - 99 mg/dL    BUN 13 6 - 20 mg/dL    Creatinine 0.82 0.57 - 1.00 mg/dL    Sodium 140 136 - 145 mmol/L    Potassium 4.1 3.5 - 5.2 mmol/L    Chloride 104 98 - 107 mmol/L    CO2 22.0 22.0 - 29.0 mmol/L    Calcium 9.5 8.6 - 10.5 mg/dL    Total Protein 7.7 6.0 - 8.5 g/dL    Albumin 4.50 3.50 - 5.20 g/dL    ALT (SGPT) 15 1 - 33 U/L    AST (SGOT) 20 1 - 32 U/L    Alkaline Phosphatase 101 39 - 117 U/L    Total Bilirubin 0.2 0.2 - 1.2 mg/dL    eGFR Non African Amer 79 >60 mL/min/1.73    Globulin 3.2 gm/dL    A/G Ratio 1.4 g/dL    BUN/Creatinine Ratio 15.9 7.0 - 25.0    Anion Gap 14.0 5.0 - 15.0 mmol/L   Lipase   Result Value Ref Range    Lipase 22 13 - 60 U/L   Pregnancy, Urine - Urine, Clean Catch   Result Value Ref Range    HCG, Urine QL Negative Negative   CBC Auto Differential   Result Value Ref Range    WBC 13.73 (H) 3.40 - 10.80 10*3/mm3    RBC 5.01 3.77 - 5.28 10*6/mm3    Hemoglobin 14.3 12.0 - 15.9 g/dL    Hematocrit 42.1 34.0 - 46.6 %    MCV 84.0 79.0 - 97.0 fL    MCH 28.5 26.6 - 33.0 pg    MCHC 34.0 31.5 - 35.7 g/dL    RDW 12.0 (L) 12.3 - 15.4 %    RDW-SD 36.5 (L) 37.0 - 54.0 fl    MPV 10.3 6.0 - 12.0 fL    Platelets 307 140 - 450 10*3/mm3    Neutrophil % 70.3 42.7 - 76.0 %    Lymphocyte % 19.4 (L) 19.6 - 45.3 %    Monocyte % 8.2 5.0 - 12.0 %    Eosinophil % 1.6 0.3 - 6.2 %    Basophil % 0.1 0.0 - 1.5 %    Immature Grans % 0.4 0.0 - 0.5 %    Neutrophils, Absolute 9.65 (H) 1.70 - 7.00 10*3/mm3    Lymphocytes, Absolute 2.66 0.70 - 3.10 10*3/mm3    Monocytes, Absolute 1.13 (H) 0.10 - 0.90 10*3/mm3    Eosinophils, Absolute 0.22 0.00 - 0.40 10*3/mm3    Basophils, Absolute 0.02 0.00 - 0.20 10*3/mm3    Immature Grans, Absolute 0.05 0.00 - 0.05 10*3/mm3    nRBC 0.0 0.0 - 0.2 /100 WBC   Urinalysis, Microscopic Only - Urine, Clean Catch   Result Value Ref Range    RBC, UA 0-2 (A) None Seen /HPF    WBC, UA 13-20 (A) None Seen, 0-2, 3-5 /HPF    Bacteria, UA None Seen None Seen  /HPF    Squamous Epithelial Cells, UA 3-5 (A) None Seen, 0-2 /HPF    Hyaline Casts, UA 0-2 None Seen /LPF    Methodology Automated Microscopy    Light Blue Top   Result Value Ref Range    Extra Tube hold for add-on    Gold Top - SST   Result Value Ref Range    Extra Tube Hold for add-ons.      Us Gallbladder    Result Date: 5/10/2020  Narrative: PROCEDURE: US GALLBLADDER INDICATION:  Right upper quadrant pain COMPARISON:  None TECHNIQUE:  Ultrasound, limited, right upper quadrant FINDINGS: Liver:    size: Limited evaluation, grossly negative    echotexture Within normal limits    no focal mass or intrahepatic biliary ductal dilatation Biliary:   Gall bladder: There are calculi at the fundus and gallbladder neck. There is a probable sludge ball at the fundus as well. Gallbladder is mildly thick walled at 0.36 cm; patient reports pain with transducer pressure over the gallbladder   Common bile duct:  Normal caliber at 0.39 cm  Pancreas (visualized portions): Limited evaluation due to overlying bowel gas. No gross abnormalities identified. Kidney, right (limited):    size:  Normal, measuring 10.1 x 5.0 x 3.8 cm   echotexture:  Normal   No nephrolithiasis, solid mass, or collecting system dilation Vascular (visualized portions):    Aorta:  Normal caliber   IVC:  Normal caliber, no intraluminal defect(s) proximally. Mid to distal IVC is obscured by overlying bowel gas     Impression: 1. Limited evaluation as overlying bowel gas partially obscures the pancreas and the mid to distal IVC. 2. Gallbladder wall thickening, cholelithiasis, sludge within the gallbladder, and positive sonographic Salvador's sign with transducer pressure over the gallbladder. Findings are concerning for acute cholecystitis. If further evaluation is clinically warranted, nuclear medicine HIDA scan could be considered. Electronically signed by:  Dee Colin MD  5/10/2020 8:37 PM CDT Workstation: [id number]             ED Course  ED Course as of May 10  2115   Sun May 10, 2020   2104 Spoke with Dr. Hough. Advised to call surgery for possible admission. If not will call hospitalist back.     [SH]   2107 Spoke with Dr. Ramachandran. Admit to hospitalist and he will consult tomorrow. Dr. Hough aware. Will admit at this time.     [SH]      ED Course User Index  [SH] Sara Araiza, CARLITOS                                           [facility]    Final diagnoses:   Acute cholecystitis            Sara Araiza, CARLITOS  05/15/20 0031

## 2020-05-11 NOTE — ANESTHESIA PROCEDURE NOTES
Airway  Urgency: elective    Date/Time: 5/11/2020 8:51 AM  Airway not difficult    General Information and Staff    Patient location during procedure: OR  CRNA: Lilian Conroy CRNA    Indications and Patient Condition  Indications for airway management: airway protection    Preoxygenated: yes  Mask difficulty assessment: 0 - not attempted    Final Airway Details  Final airway type: endotracheal airway      Successful airway: ETT  Cuffed: yes   Successful intubation technique: direct laryngoscopy  Facilitating devices/methods: intubating stylet and cricoid pressure  Endotracheal tube insertion site: oral  Blade: Jasmin  Blade size: 3  ETT size (mm): 7.0  Cormack-Lehane Classification: grade IIb - view of arytenoids or posterior of glottis only  Placement verified by: chest auscultation and capnometry   Measured from: lips  ETT/EBT  to lips (cm): 20  Number of attempts at approach: 2  Assessment: lips, teeth, and gum same as pre-op and atraumatic intubation

## 2020-05-11 NOTE — CONSULTS
Referring Provider: Dr. Hough      Patient Care Team:  Dalia Patel APRN as PCP - General (Family Medicine)  Holly, CARLITOS Perkins as Gynecologist (Nurse Practitioner)      Subjective .  Right upper quadrant abdominal pain    History of present illness:   34-year-old female presented to emergency room after being awakened from sleep with acute onset of sharp crampy right upper quadrant abdominal pain that radiated straight through to her back.  Never had this before.  This was associated with nausea and vomiting.  This actually happened Saturday night and she waited until Sunday to come in because it did not completely resolve.  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  No history of pancreatitis no history of jaundice.  White count was 13,000.  LFTs were all normal.  Ultrasound demonstrated gallstones with a 3.6 mm thick gallbladder wall and a normal size common bile duct at 3.9 mm.  Only prior abdominal surgery was diagnostic laparoscopy by GYN.  Patient feels better after IV fluids and receiving Zosyn IV antibiotics overnight but she feels the pain coming back to much lesser degree.  No further nausea or vomiting.    Review of Systems   Constitutional: Negative for appetite change, chills, fever and unexpected weight change.   HENT: Negative for hearing loss, nosebleeds and trouble swallowing.    Eyes: Negative for visual disturbance.   Respiratory: Negative for apnea, cough, choking, chest tightness, shortness of breath, wheezing and stridor.    Cardiovascular: Negative for chest pain, palpitations and leg swelling.   Gastrointestinal: Positive for abdominal pain, nausea and vomiting. Negative for abdominal distention, blood in stool, constipation and diarrhea.   Endocrine: Negative for cold intolerance, heat intolerance, polydipsia, polyphagia and polyuria.   Genitourinary: Negative for difficulty urinating, dysuria, frequency, hematuria and urgency.      Musculoskeletal: Negative for arthralgias, back pain, myalgias and neck pain.   Skin: Negative for color change, pallor and rash.   Allergic/Immunologic: Negative for immunocompromised state.   Neurological: Negative for dizziness, seizures, syncope, light-headedness, numbness and headaches.   Hematological: Negative for adenopathy.   Psychiatric/Behavioral: Negative for suicidal ideas. The patient is not nervous/anxious.          History  Past Medical History:   Diagnosis Date   • Abnormal glucose tolerance test during pregnancy, not yet delivered     baby not yet delivered   • Absence of menstruation    • Dietary counseling and surveillance    • Encounter for routine gynecological examination    • Need for prophylactic vaccination and inoculation against influenza    • Routine postpartum follow-up    • Tuberculosis screening    • Vaginitis and vulvovaginitis    • Visit for gynecologic examination    , Past Surgical History:   Procedure Laterality Date   • DIAGNOSTIC LAPAROSCOPY     • PAP SMEAR  03/08/2011     Negative    , Family History   Problem Relation Age of Onset   • Breast cancer Other    • Diabetes Other    • Hypertension Other    , Social History     Tobacco Use   • Smoking status: Never Smoker   • Smokeless tobacco: Never Used   Substance Use Topics   • Alcohol use: No   • Drug use: No   , Home Medications:  Prior to Admission medications    Medication Sig Start Date End Date Taking? Authorizing Provider   ondansetron ODT (ZOFRAN-ODT) 4 MG disintegrating tablet  2/7/20   Provider, MD Radha   , Scheduled Meds:    [MAR Hold] piperacillin-tazobactam 3.375 g Intravenous Q8H   [MAR Hold] sodium chloride 10 mL Intravenous Q12H   sodium chloride 3 mL Intravenous Q12H   , Continuous Infusions:    sodium chloride 100 mL/hr Last Rate: 100 mL/hr (05/11/20 0625)   , PRN Meds:  •  [MAR Hold] acetaminophen **OR** [DISCONTINUED] acetaminophen **OR** [MAR Hold] acetaminophen  •  [MAR Hold] HYDROmorphone **AND**  [MAR Hold] naloxone  •  [MAR Hold] ondansetron **OR** [MAR Hold] ondansetron  •  [COMPLETED] Insert peripheral IV **AND** [MAR Hold] sodium chloride  •  [MAR Hold] sodium chloride  •  sodium chloride and Allergies:  No Known Allergies    Objective     Vital Signs   Temp:  [97.3 °F (36.3 °C)-98.1 °F (36.7 °C)] 97.5 °F (36.4 °C)  Heart Rate:  [80-84] 81  Resp:  [16-18] 16  BP: (100-151)/(56-91) 113/56    Physical Exam:     General Appearance:    Alert, cooperative, in no acute distress   Head:    Normocephalic, without obvious abnormality, atraumatic   Eyes:            Lids and lashes normal, conjunctivae and sclerae normal, no   icterus, no pallor, corneas clear   Ears:    Ears appear intact with no abnormalities noted   Throat:   No oral lesions, no thrush, oral mucosa moist   Neck:   No adenopathy, supple, trachea midline, no thyromegaly,   no JVD   Lungs:     Clear to auscultation,respirations regular, even and                  unlabored    Heart:    Regular rhythm and normal rate, normal S1 and S2, no            murmur, no gallop, no rub, no click   Chest Wall:    No abnormalities observed   Abdomen:     Normal bowel sounds, no masses, no organomegaly, soft        non-tender, non-distended, no guarding, no rebound                tenderness   Extremities:   Moves all extremities well, no edema, no cyanosis, no             redness   Skin:   No bleeding, bruising or rash   Lymph nodes:   No palpable adenopathy   Neurologic:  Grossly intact, sensation intact       Results Review:   I reviewed the patient's new clinical results.      Assessment/Plan       Acute cholecystitis        I discussed the patients findings and my recommendations with patient and consulting provider     Reviewed the ultrasound and the lab studies with the patient.  Would recommend laparoscopic cholecystectomy and interoperative cholangiogram.  Fully discussed the procedure alternatives risk benefits with the patient she clearly understands and  wishes to proceed        This document has been electronically signed by Anton Ramachandran MD on May 11, 2020 08:12     Anton Ramachandran MD  05/11/20  08:12

## 2020-05-11 NOTE — ANESTHESIA PREPROCEDURE EVALUATION
Anesthesia Evaluation     NPO Solid Status: > 8 hours             Airway   Mallampati: II  Dental      Pulmonary    (-) asthma, sleep apnea, not a smoker    ROS comment: Negative patient screen for DONI    Cardiovascular         Neuro/Psych  GI/Hepatic/Renal/Endo      Musculoskeletal     Abdominal    Substance History      OB/GYN    (-)  Pregnant        Other                        Anesthesia Plan    ASA 1 - emergent     general       Anesthetic plan, all risks, benefits, and alternatives have been provided, discussed and informed consent has been obtained with: patient.

## 2020-05-11 NOTE — PLAN OF CARE
Problem: Patient Care Overview  Goal: Plan of Care Review  Outcome: Ongoing (interventions implemented as appropriate)  Flowsheets (Taken 5/11/2020 3731)  Progress: no change  Plan of Care Reviewed With: patient  Outcome Summary: VSS, pain and nausea controlled. NPO. Will continue to monitor.

## 2020-05-11 NOTE — OP NOTE
CHOLECYSTECTOMY LAPAROSCOPIC INTRAOPERATIVE CHOLANGIOGRAM  Procedure Note    Rachel Salgado  5/11/2020    Pre-op Diagnosis:   Acute cholecystitis [K81.0]    Post-op Diagnosis:     Post-Op Diagnosis Codes:     * Acute cholecystitis [K81.0]    Procedure/CPT® Codes:      Procedure(s):  CHOLECYSTECTOMY LAPAROSCOPIC INTRAOPERATIVE CHOLANGIOGRAM    Surgeon(s):  Anton Ramachandran MD    Anesthesia: General    Staff:   Circulator: Kenia Tidwell RN; Melia Fletcher RN  Radiology Technologist: Sheri Fernando  Scrub Person: Mellissa Kincaid  Assistant: Anu Stevenson CSA    Estimated Blood Loss: minimal    Specimens:                ID Type Source Tests Collected by Time   A : gallbladder Tissue Gallbladder TISSUE PATHOLOGY EXAM Anton Ramachandran MD 5/11/2020 1014         Drains: * No LDAs found *    Indications: 34-year-old female presented emergency room overnight with right upper quadrant pain and work-up consistent with acute cholecystitis. She received IV Zosyn and IV fluids presents now for laparoscopic cholecystectomy interoperative cholangiogram    Findings: Acutely inflamed gallbladder with at least 2 stones present.  Obvious edema in the wall of gallbladder.  Normal intraoperative cholangiogram with good spontaneous flow in the duodenum no evidence of any common duct stones good flow up into hepatic radicles and adequate cystic duct    Complications: None    Procedure:  The patient was brought to the operating room where she was placed under general endotracheal anesthesia without difficulty.  She received Zosyn IV antibiotics perioperatively.  She had SCDs in place.  Her abdomen was prepped and draped in the normal sterile fashion.  An appropriate timeout was taken and everyone was in agreement.  Cutdown was performed at the supraumbilical position.  A 10/11 Divya trocar was placed without any difficulty.  A TAP abdominal wall block was then performed with a total of 30 mL of 0.5% Marcaine. We injected 20  mL on the right side of the abdomen and 10 mL on the left side using the laparoscope for guidance.  A 5 mm trocar was placed in the epigastrium and two 5 mm trocar were placed laterally under direct vision without any difficulty.  Overall exploration of the abdomen laparoscopically was unremarkable.  The patient was then positioned.  We aspirated approximately 10 mL bile-like fluid out of the gallbladder.  This allowed us to grasp the gallbladder easier.  We began by taking the gallbladder down in a dome-down type technique. We took it down approximately correction in the bed of the liver and then pulled the gallbladder up over the edge of the liver.  There was a stone felt in the neck of the gallbladder which we moved up into the body of the gallbladder and then grasped the neck of the gallbladder. We opened the peritoneum on the anterior and posterior aspects of Calot triangle.  There was edema in this area.  This was a  inferior to where the stone had been wedged.  I was able to get around the neck of the gallbladder junction with the cystic duct and got a good critical view.  The cystic artery was divided with a Harmonic scalpel.  A Boland clamp was placed across the neck of the gallbladder and the catheter was then placed into position with a needle.  The cholangiogram was performed with findings as described.  The catheter was then removed and we placed 2 clips proximally and 1 clip distally right at the junction with the cystic duct and the neck of the gallbladder.  We had divided the cystic duct and placed a #1 PDS Endoloop around the cystic duct leaving 2 clips on the cystic duct along with the Endoloop. We then completed our dissection removing the gallbladder from the bed of the liver using a right angle and the Harmonic scalpel, freeing the gallbladder up completely.  The area was irrigated and good hemostasis was noted at the completion of the dissection.  The gallbladder was placed in an Endo Catch bag and  removed through the cutdown site.  We then inspected the areas of dissection.  Good hemostasis was noted.  The trocars were removed.  As we were closing the fascia at the cutdown site, there appeared to be more bleeding than we expected with placement of a stitch, so we removed the stitch in the fascia and then placed a Divya trocar back at the cutdown site and then placed another 5 mm trocar out laterally and put the abdomen back under CO2 pressure.  The area where we dissected the gallbladder had no evidence of significant bleeding noted.  We looked back at our trocar site and there was no bleeding within the abdomen, so suspected the bleeding was right from the muscle where the suture had been placed.  We took the trocars out again and then carefully closed the fascia at the cutdown site.  It did not have any significant bleeding and then subsequently we did a 2-0 Vicryl figure-of-eight stitch. The skin was closed at all sites with a 4-0 Monocryl subcuticular stitch. Glue was used for final skin closure at all sites. The patient was awakened, extubated, and transferred to the recovery room, awake and in stable condition.        Disposition: Transfer to recovery room stable condition        Anton Ramachandran MD     Date: 5/11/2020  Time: 10:47

## 2020-05-11 NOTE — H&P
HISTORY AND PHYSICAL   [facility]        Patient Identification:  Name: Rachel Salgado  Age: 34 y.o.  Sex: female  :  1985  MRN: [id number]                     Primary Care Physician: Dalia Patel APRN    Chief Complaint: Abdominal pain with nausea    History of Present Illness:        The patient is a 34-year-old white female with no significant medical problems who was admitted with 1 day history of having severe right upper quadrant abdominal pain with nausea but no vomiting.  She is not had any fever or chills.  She is not had any chest pain or shortness of air.  The patient was evaluated in the ER and CT scan showed changes consistent with acute cholecystitis with gallbladder wall thickening and gallstones and patient had elevated white count.  The patient was given some medication for pain and nausea and started on some IV antibiotics and admitted for further evaluation treatment.  Surgery was also consulted from ER and Dr. Ramachandran agreed to consult.    Past Medical History:  Past Medical History:   Diagnosis Date   • Abnormal glucose tolerance test during pregnancy, not yet delivered     baby not yet delivered   • Absence of menstruation    • Dietary counseling and surveillance    • Encounter for routine gynecological examination    • Need for prophylactic vaccination and inoculation against influenza    • Routine postpartum follow-up    • Tuberculosis screening    • Vaginitis and vulvovaginitis    • Visit for gynecologic examination      Past Surgical History:  Past Surgical History:   Procedure Laterality Date   • DIAGNOSTIC LAPAROSCOPY     • PAP SMEAR  2011     Negative       Home Meds:    (Not in a hospital admission)  CURRENT MEDS    Current Facility-Administered Medications:   •  piperacillin-tazobactam (ZOSYN) 3.375 g/100 mL 0.9% NS IVPB (mbp), 3.375 g, Intravenous, Once, Sara Araiza APRN, 3.375 g at 05/10/20 2122  •  [COMPLETED] Insert peripheral IV, ,  , Once **AND** sodium chloride 0.9 % flush 10 mL, 10 mL, Intravenous, PRN, Sara Araiza APRN    Current Outpatient Medications:   •  ondansetron ODT (ZOFRAN-ODT) 4 MG disintegrating tablet, , Disp: , Rfl:   Allergies:  No Known Allergies  Immunizations:  Immunization History   Administered Date(s) Administered   • DTaP 1985, 01/24/1985, 06/19/1985, 10/16/1986, 09/08/1988, 03/18/1999   • Flu Vaccine Quad PF >18YRS 10/09/2017   • Hepatitis B 03/07/2007, 05/11/2007, 09/17/2007, 08/22/2016, 09/22/2016, 03/06/2017   • MMR 12/19/1985, 03/06/1995, 08/22/2016   • PPD Test 11/20/2013, 08/10/2016, 07/26/2017   • Polio, Unspecified 1984, 01/24/1985, 10/16/1986, 09/08/1988   • Tdap 07/21/2010   • Varicella 03/23/2011   • influenza Split 12/13/2013, 10/02/2014, 10/09/2015, 09/22/2016     Social History:   Social History     Social History Narrative   • Not on file     Social History     Socioeconomic History   • Marital status:      Spouse name: Not on file   • Number of children: Not on file   • Years of education: Not on file   • Highest education level: Not on file   Tobacco Use   • Smoking status: Never Smoker   • Smokeless tobacco: Never Used   Substance and Sexual Activity   • Alcohol use: No   • Drug use: No   • Sexual activity: Yes     Partners: Male     Birth control/protection: None       Family History:  Family History   Problem Relation Age of Onset   • Breast cancer Other    • Diabetes Other    • Hypertension Other         Review of Systems  See history of present illness and past medical history.  Patient denies headache, dizziness, syncope, falls, trauma, change in vision, change in hearing, change in taste, changes in weight, changes in appetite, focal weakness, numbness, or paresthesia.  Patient denies chest pain, palpitations, dyspnea, orthopnea, PND, cough, sinus pressure, rhinorrhea, epistaxis, hemoptysis,  vomiting,hematemesis, diarrhea, constipation or hematchezia.  Denies cold  "or heat intolerance, polydipsia, polyuria, polyphagia. Denies hematuria, pyuria, dysuria, hesitancy, frequency or urgency.  Denies fever, chills, sweats, night sweats.   Remainder of ROS is negative.    Objective:  tMax 24 hrs: Temp (24hrs), Av.1 °F (36.7 °C), Min:98.1 °F (36.7 °C), Max:98.1 °F (36.7 °C)    Vitals Ranges:   Temp:  [98.1 °F (36.7 °C)] 98.1 °F (36.7 °C)  Heart Rate:  [81-84] 84  Resp:  [16] 16  BP: (130-151)/(87-89) 141/89      Exam:  /89 (BP Location: Left arm, Patient Position: Sitting)   Pulse 84   Temp 98.1 °F (36.7 °C) (Skin)   Resp 16   Ht 157.5 cm (62\")   Wt 63.5 kg (140 lb)   LMP 2020   SpO2 95%   BMI 25.61 kg/m²     General Appearance:    Alert, cooperative, no distress, appears stated age   Head:    Normocephalic, without obvious abnormality, atraumatic   Eyes:    PERRL, conjunctiva/corneas clear, EOM's intact, both eyes   Ears:    Normal external ear canals, both ears   Nose:   Nares normal, septum midline, mucosa normal, no drainage    or sinus tenderness   Throat:   Lips, mucosa, and tongue normal   Neck:   Supple, symmetrical, trachea midline, no adenopathy;     thyroid:  no enlargement/tenderness/nodules; no carotid    bruit or JVD   Back:     Symmetric, no curvature, ROM normal, no CVA tenderness   Lungs:     Clear to auscultation bilaterally, respirations unlabored   Chest Wall:    No tenderness or deformity    Heart:    Regular rate and rhythm, S1 and S2 normal, no murmur, rub   or gallop   Abdomen:     Soft, tender RUQ, bowel sounds active all four quadrants,     no masses, no hepatomegaly, no splenomegaly   Extremities:   Extremities normal, atraumatic, no cyanosis or edema   Pulses:   2+ and symmetric all extremities   Skin:   Skin color, texture, turgor normal, no rashes or lesions   Lymph nodes:   Cervical, supraclavicular, and axillary nodes normal   Neurologic:   CNII-XII intact, normal strength, sensation intact throughout      .    Data Review:  Lab " Results (last 72 hours)     Procedure Component Value Units Date/Time    Extra Tubes [[id number]] Collected:  05/10/20 1849    Specimen:  Blood, Venous Line Updated:  05/10/20 2000    Narrative:       The following orders were created for panel order Extra Tubes.  Procedure                               Abnormality         Status                     ---------                               -----------         ------                     Light Blue Top[[id number]]                                   Final result               Gold Top - SST[[id number]]                                   Final result                 Please view results for these tests on the individual orders.    Gold Top - SST [[id number]] Collected:  05/10/20 1849    Specimen:  Blood Updated:  05/10/20 2000     Extra Tube Hold for add-ons.     Comment: Auto resulted.       Light Blue Top [[id number]] Collected:  05/10/20 1849    Specimen:  Blood Updated:  05/10/20 2000     Extra Tube hold for add-on     Comment: Auto resulted       Comprehensive Metabolic Panel [[id number]]  (Abnormal) Collected:  05/10/20 1849    Specimen:  Blood Updated:  05/10/20 1913     Glucose 124 mg/dL      BUN 13 mg/dL      Creatinine 0.82 mg/dL      Sodium 140 mmol/L      Potassium 4.1 mmol/L      Chloride 104 mmol/L      CO2 22.0 mmol/L      Calcium 9.5 mg/dL      Total Protein 7.7 g/dL      Albumin 4.50 g/dL      ALT (SGPT) 15 U/L      AST (SGOT) 20 U/L      Alkaline Phosphatase 101 U/L      Total Bilirubin 0.2 mg/dL      eGFR Non African Amer 79 mL/min/1.73      Globulin 3.2 gm/dL      A/G Ratio 1.4 g/dL      BUN/Creatinine Ratio 15.9     Anion Gap 14.0 mmol/L     Narrative:       GFR Normal >60  Chronic Kidney Disease <60  Kidney Failure <15      Lipase [[id number]]  (Normal) Collected:  05/10/20 1849    Specimen:  Blood Updated:  05/10/20 1913     Lipase 22 U/L     CBC & Differential [[id number]] Collected:  05/10/20 1849    Specimen:  Blood Updated:  05/10/20 1854    Narrative:        The following orders were created for panel order CBC & Differential.  Procedure                               Abnormality         Status                     ---------                               -----------         ------                     CBC Auto Differential[[id number]]        Abnormal            Final result                 Please view results for these tests on the individual orders.    CBC Auto Differential [[id number]]  (Abnormal) Collected:  05/10/20 1849    Specimen:  Blood Updated:  05/10/20 1854     WBC 13.73 10*3/mm3      RBC 5.01 10*6/mm3      Hemoglobin 14.3 g/dL      Hematocrit 42.1 %      MCV 84.0 fL      MCH 28.5 pg      MCHC 34.0 g/dL      RDW 12.0 %      RDW-SD 36.5 fl      MPV 10.3 fL      Platelets 307 10*3/mm3      Neutrophil % 70.3 %      Lymphocyte % 19.4 %      Monocyte % 8.2 %      Eosinophil % 1.6 %      Basophil % 0.1 %      Immature Grans % 0.4 %      Neutrophils, Absolute 9.65 10*3/mm3      Lymphocytes, Absolute 2.66 10*3/mm3      Monocytes, Absolute 1.13 10*3/mm3      Eosinophils, Absolute 0.22 10*3/mm3      Basophils, Absolute 0.02 10*3/mm3      Immature Grans, Absolute 0.05 10*3/mm3      nRBC 0.0 /100 WBC     Pregnancy, Urine - Urine, Clean Catch [[id number]]  (Normal) Collected:  05/10/20 1834    Specimen:  Urine, Clean Catch Updated:  05/10/20 1839     HCG, Urine QL Negative    Urinalysis, Microscopic Only - Urine, Clean Catch [[id number]]  (Abnormal) Collected:  05/10/20 1831    Specimen:  Urine, Clean Catch Updated:  05/10/20 1837     RBC, UA 0-2 /HPF      WBC, UA 13-20 /HPF      Bacteria, UA None Seen /HPF      Squamous Epithelial Cells, UA 3-5 /HPF      Hyaline Casts, UA 0-2 /LPF      Methodology Automated Microscopy    Urinalysis With Microscopic If Indicated (No Culture) - Urine, Clean Catch [[id number]]  (Abnormal) Collected:  05/10/20 1831    Specimen:  Urine, Clean Catch Updated:  05/10/20 1836     Color, UA Yellow     Appearance, UA Clear     pH, UA 5.5     Specific  Gravity, UA 1.025     Glucose, UA Negative     Ketones, UA Negative     Bilirubin, UA Negative     Blood, UA Negative     Protein, UA Negative     Leuk Esterase, UA Small (1+)     Nitrite, UA Negative     Urobilinogen, UA 0.2 E.U./dL                   Imaging Results (All)     Procedure Component Value Units Date/Time    US Gallbladder [[id number]] Collected:  05/10/20 1946     Updated:  05/10/20 2039    Narrative:       PROCEDURE: US GALLBLADDER    INDICATION:  Right upper quadrant pain     COMPARISON:  None    TECHNIQUE:  Ultrasound, limited, right upper quadrant    FINDINGS:    Liver:      size: Limited evaluation, grossly negative      echotexture Within normal limits      no focal mass or intrahepatic biliary ductal dilatation     Biliary:    Gall bladder: There are calculi at the fundus and gallbladder  neck. There is a probable sludge ball at the fundus as well.  Gallbladder is mildly thick walled at 0.36 cm; patient reports  pain with transducer pressure over the gallbladder    Common bile duct:  Normal caliber at 0.39 cm      Pancreas (visualized portions): Limited evaluation due to  overlying bowel gas. No gross abnormalities identified.    Kidney, right (limited):      size:  Normal, measuring 10.1 x 5.0 x 3.8 cm    echotexture:  Normal    No nephrolithiasis, solid mass, or collecting system dilation    Vascular (visualized portions):      Aorta:  Normal caliber    IVC:  Normal caliber, no intraluminal defect(s) proximally. Mid  to distal IVC is obscured by overlying bowel gas      Impression:       1. Limited evaluation as overlying bowel gas partially obscures  the pancreas and the mid to distal IVC.  2. Gallbladder wall thickening, cholelithiasis, sludge within the  gallbladder, and positive sonographic Salvador's sign with  transducer pressure over the gallbladder. Findings are concerning  for acute cholecystitis. If further evaluation is clinically  warranted, nuclear medicine HIDA scan could be  considered.    Electronically signed by:  Dee Colin MD  5/10/2020 8:37 PM CDT  Workstation: [id number]        Patient Active Problem List   Diagnosis Code   • Acute cholecystitis K81.0       Assessment:  Active Hospital Problems    Diagnosis  POA   • **Acute cholecystitis [K81.0]  Yes      Resolved Hospital Problems   No resolved problems to display.       Plan:  The patient is admitted to the hospital and will continue with broad-spectrum IV antibiotics and IV fluid.  We will continue with medication for pain and nausea.  We will keep patient n.p.o. and consult general surgery.  She will likely need cholecystectomy at some point when felt to be stable by surgery.  The patient appears to be medically stable for surgery whenever surgery feels they need to do cholecystectomy.    Chucho Hough MD  5/10/2020  21:26

## 2020-05-11 NOTE — DISCHARGE SUMMARY
Discharge Summary    Date of Admission: 5/10/2020  Date of Discharge:  5/11/2020  Service: General Surgery  Attending: Anton Ramachandran    Procedures Performed: Procedure(s):  CHOLECYSTECTOMY LAPAROSCOPIC INTRAOPERATIVE CHOLANGIOGRAM  Consults:   Consults     Date and Time Order Name Status Description    5/10/2020 2131 Inpatient General Surgery Consult Completed     5/10/2020 2113 Surgery (on-call MD unless specified)          Discharge Diagnoses:   Active Hospital Problems    Diagnosis   • **Acute cholecystitis       Hospital Course: Admitted with acute cholecystitis and underwent lap brayden and normal ioc without incident.  Doing well at discharge.  Discussed diet and wound care.  Gave 12 norco 7.5 tabs.  Restart any home meds.      Discharge Condition: Postoperatively Stable    Discharge Medications:     Your medication list      START taking these medications      Instructions Last Dose Given Next Dose Due   HYDROcodone-acetaminophen 7.5-325 MG per tablet  Commonly known as:  NORCO      Take 1 tablet by mouth Every 6 (Six) Hours As Needed for Moderate Pain  (Pain).          CONTINUE taking these medications      Instructions Last Dose Given Next Dose Due   ondansetron ODT 4 MG disintegrating tablet  Commonly known as:  ZOFRAN-ODT                 Where to Get Your Medications      These medications were sent to [facility] - [address] - [phone]  - [phone] [address] [address]    Phone:  [phone]   · HYDROcodone-acetaminophen 7.5-325 MG per tablet           Activity as instructed by Dr. Ramachandran.  No lifting over 20 lbs until seen in the office.  Shower as instructed.      Regular Diet      Follow-up Appointments  Your Scheduled Appointments    Jun 03, 2020 11:00 AM CDT  Annual with CARLITOS Fagan  [facility] (--) [address] [place] [address] DR PRISCILLA KOENIG [id number] [phone]                       This document has been electronically signed by Anton Ramachandran MD on May 11, 2020 17:08

## 2020-05-11 NOTE — PROGRESS NOTES
[facility] Medicine Services  INPATIENT PROGRESS NOTE    Length of Stay: 0  Date of Admission: 5/10/2020  Primary Care Physician: Dalia Patel APRN    Subjective   Chief Complaint:  Abdominal pain, nausea  HPI: Patient states that she is feeling okay now.  She was seen after her surgery.  She has increased nausea with movement.    Review of Systems   Constitutional: Negative for appetite change, chills, fatigue, fever and unexpected weight change.   Respiratory: Negative for cough, choking, chest tightness, shortness of breath and wheezing.    Cardiovascular: Negative for chest pain, palpitations and leg swelling.   Gastrointestinal: Positive for abdominal pain, nausea and vomiting. Negative for blood in stool, constipation and diarrhea.   Genitourinary: Negative for dysuria, flank pain and hematuria.   Neurological: Negative for dizziness, seizures, syncope, speech difficulty, weakness, light-headedness, numbness and headaches.   Hematological: Does not bruise/bleed easily.        All pertinent negatives and positives are as above. All other systems have been reviewed and are negative unless otherwise stated.     Objective    Temp:  [96.5 °F (35.8 °C)-98.1 °F (36.7 °C)] 96.5 °F (35.8 °C)  Heart Rate:  [] 99  Resp:  [16-22] 16  BP: (100-151)/(56-91) 121/76    Physical Exam   Constitutional: She appears well-developed and well-nourished.   HENT:   Head: Normocephalic and atraumatic.   Eyes: Pupils are equal, round, and reactive to light. EOM are normal.   Neck: Normal range of motion. Neck supple.   Cardiovascular: Normal rate, regular rhythm and normal heart sounds. Exam reveals no gallop and no friction rub.   No murmur heard.  Pulmonary/Chest: Effort normal and breath sounds normal. No respiratory distress. She has no wheezes. She has no rales. She exhibits no tenderness.   Abdominal: Soft. Bowel sounds are normal. She exhibits no distension. There is no guarding.    Dressings clean dry and intact.  Abdomen appropriately tender.   Musculoskeletal: She exhibits no edema.   Skin: Skin is warm and dry.   Psychiatric: She has a normal mood and affect. Her behavior is normal. Thought content normal.   Vitals reviewed.    Results Review:  I have reviewed the labs, radiology results, and diagnostic studies.    Laboratory Data:   Results from last 7 days   Lab Units 05/11/20  0552 05/10/20  1849   SODIUM mmol/L 140 140   POTASSIUM mmol/L 4.1 4.1   CHLORIDE mmol/L 107 104   CO2 mmol/L 24.0 22.0   BUN mg/dL 12 13   CREATININE mg/dL 0.76 0.82   GLUCOSE mg/dL 96 124*   CALCIUM mg/dL 8.5* 9.5   BILIRUBIN mg/dL 0.5 0.2   ALK PHOS U/L 88 101   ALT (SGPT) U/L 20 15   AST (SGOT) U/L 19 20   ANION GAP mmol/L 9.0 14.0     Estimated Creatinine Clearance: 90.5 mL/min (by C-G formula based on SCr of 0.76 mg/dL).          Results from last 7 days   Lab Units 05/11/20  0552 05/10/20  1849   WBC 10*3/mm3 9.05 13.73*   HEMOGLOBIN g/dL 12.2 14.3   HEMATOCRIT % 37.2 42.1   PLATELETS 10*3/mm3 238 307           Culture Data:   No results found for: BLOODCX  No results found for: URINECX  No results found for: RESPCX  No results found for: WOUNDCX  No results found for: STOOLCX  No components found for: BODYFLD    Radiology Data:   Imaging Results (Last 24 Hours)     Procedure Component Value Units Date/Time    FL C Arm During Surgery [[id number]] Resulted:  05/11/20 1420     Updated:  05/11/20 1420    US Gallbladder [[id number]] Collected:  05/10/20 1946     Updated:  05/10/20 2039    Narrative:       PROCEDURE: US GALLBLADDER    INDICATION:  Right upper quadrant pain     COMPARISON:  None    TECHNIQUE:  Ultrasound, limited, right upper quadrant    FINDINGS:    Liver:      size: Limited evaluation, grossly negative      echotexture Within normal limits      no focal mass or intrahepatic biliary ductal dilatation     Biliary:    Gall bladder: There are calculi at the fundus and gallbladder  neck. There is a  probable sludge ball at the fundus as well.  Gallbladder is mildly thick walled at 0.36 cm; patient reports  pain with transducer pressure over the gallbladder    Common bile duct:  Normal caliber at 0.39 cm      Pancreas (visualized portions): Limited evaluation due to  overlying bowel gas. No gross abnormalities identified.    Kidney, right (limited):      size:  Normal, measuring 10.1 x 5.0 x 3.8 cm    echotexture:  Normal    No nephrolithiasis, solid mass, or collecting system dilation    Vascular (visualized portions):      Aorta:  Normal caliber    IVC:  Normal caliber, no intraluminal defect(s) proximally. Mid  to distal IVC is obscured by overlying bowel gas      Impression:       1. Limited evaluation as overlying bowel gas partially obscures  the pancreas and the mid to distal IVC.  2. Gallbladder wall thickening, cholelithiasis, sludge within the  gallbladder, and positive sonographic Salvador's sign with  transducer pressure over the gallbladder. Findings are concerning  for acute cholecystitis. If further evaluation is clinically  warranted, nuclear medicine HIDA scan could be considered.    Electronically signed by:  Dee Colin MD  5/10/2020 8:37 PM CDT  Workstation: [id number]          I have reviewed the patient's current medications.     Assessment/Plan     Active Hospital Problems    Diagnosis   • **Acute cholecystitis       Plan:    1.  Acute cholecystitis: Status post laparoscopic cholecystectomy and intraoperative cholangiogram.  Some nausea postoperatively.  Doing well now.  2.  Nausea and vomiting: Better with Zofran.  Likely at least somewhat related to anesthesia.  Continue to monitor.      The patient was evaluated during the global COVID-19 pandemic, and the diagnosis was suspected/considered upon their initial presentation.  Evaluation, treatment, and testing was consistent with current guidelines for patients who present with complaints or symptoms that may be related to  COVID-19.    Discharge Planning: I expect patient to be discharged to home in 1-2 days.        This document has been electronically signed by Kevin Melendrez MD on May 11, 2020 15:32

## 2020-05-11 NOTE — ANESTHESIA POSTPROCEDURE EVALUATION
Patient: Rachel Salgado    Procedure Summary     Date:  05/11/20 Room / Location:  [facility] OR  / [facility] OR    Anesthesia Start:  0840 Anesthesia Stop:  1033    Procedure:  CHOLECYSTECTOMY LAPAROSCOPIC INTRAOPERATIVE CHOLANGIOGRAM (N/A Abdomen) Diagnosis:       Acute cholecystitis      (Acute cholecystitis [K81.0])    Surgeon:  Anton Ramachandran MD Provider:  Ji Merino MD    Anesthesia Type:  general ASA Status:  1 - Emergent          Anesthesia Type: general    Vitals  No vitals data found for the desired time range.          Post Anesthesia Care and Evaluation    Patient location during evaluation: PACU  Patient participation: complete - patient participated  Level of consciousness: awake  Pain score: 0  Pain management: adequate  Airway patency: patent  Anesthetic complications: No anesthetic complications  PONV Status: none  Cardiovascular status: acceptable  Respiratory status: acceptable  Hydration status: acceptable

## 2020-05-11 NOTE — PLAN OF CARE
Problem: Patient Care Overview  Goal: Plan of Care Review  5/11/2020 1544 by Camila Jiang RN  Outcome: Ongoing (interventions implemented as appropriate)  Flowsheets  Taken 5/11/2020 1537  Progress: improving  Outcome Summary: VSS for shift, gallbladder removed; no complaints of pain, pt is nauseated but now eating ice cream;  Taken 5/11/2020 1544  Plan of Care Reviewed With: patient  5/11/2020 1537 by Camila Jiang RN  Outcome: Ongoing (interventions implemented as appropriate)  Flowsheets (Taken 5/11/2020 1537)  Progress: improving  Plan of Care Reviewed With: patient  Outcome Summary: VSS for shift, gallbladder removed; no complaints of pain, pt is nauseated but now eating ice cream;  Goal: Individualization and Mutuality  5/11/2020 1544 by Camila Jiang RN  Outcome: Ongoing (interventions implemented as appropriate)  5/11/2020 1537 by Camila Jiang RN  Outcome: Ongoing (interventions implemented as appropriate)  Goal: Discharge Needs Assessment  5/11/2020 1544 by Camila Jiang RN  Outcome: Ongoing (interventions implemented as appropriate)  5/11/2020 1537 by Camila Jiang RN  Outcome: Ongoing (interventions implemented as appropriate)  Goal: Interprofessional Rounds/Family Conf  5/11/2020 1544 by Camila Jiang RN  Outcome: Ongoing (interventions implemented as appropriate)  5/11/2020 1537 by Camila Jiang RN  Outcome: Ongoing (interventions implemented as appropriate)     Problem: Infection, Risk/Actual (Adult)  Goal: Infection Prevention/Resolution  5/11/2020 1544 by Camila Jiang RN  Outcome: Ongoing (interventions implemented as appropriate)  5/11/2020 1537 by Camila Jiang RN  Outcome: Ongoing (interventions implemented as appropriate)     Problem: Nausea/Vomiting (Adult)  Goal: Symptom Relief  5/11/2020 1544 by Camila Jiang RN  Outcome: Ongoing (interventions implemented as appropriate)  5/11/2020 1537 by Camila Jiang RN  Outcome: Ongoing (interventions implemented as appropriate)  Goal:  Adequate Hydration  5/11/2020 1544 by Camila Jiang RN  Outcome: Ongoing (interventions implemented as appropriate)  5/11/2020 1537 by Camila Jiang RN  Outcome: Ongoing (interventions implemented as appropriate)     Problem: Pain, Acute (Adult)  Goal: Acceptable Pain Control/Comfort Level  5/11/2020 1544 by Camila Jiang RN  Outcome: Ongoing (interventions implemented as appropriate)  5/11/2020 1537 by Camila Jiang RN  Outcome: Ongoing (interventions implemented as appropriate)

## 2020-05-11 NOTE — NURSING NOTE
Patient declined the need to call her . Stated she had been speaking to him throughout the day. No concerns voiced.

## 2020-05-12 ENCOUNTER — TRANSITIONAL CARE MANAGEMENT TELEPHONE ENCOUNTER (OUTPATIENT)
Dept: CALL CENTER | Facility: HOSPITAL | Age: 35
End: 2020-05-12

## 2020-05-12 LAB
LAB AP CASE REPORT: NORMAL
PATH REPORT.FINAL DX SPEC: NORMAL
PATH REPORT.GROSS SPEC: NORMAL

## 2020-05-12 NOTE — OUTREACH NOTE
Prep Survey      Responses   [facility] facility patient discharged from?  [place]   Is LACE score < 7 ?  Yes   Eligibility  [facility]   Date of Admission  05/10/20   Date of Discharge  05/11/20   Discharge Disposition  Home or Self Care   Discharge diagnosis  Lap brayden   COVID-19 Test Status  Not tested   Does the patient have one of the following disease processes/diagnoses(primary or secondary)?  General Surgery   Does the patient have Home health ordered?  No   Is there a DME ordered?  No   Prep survey completed?  Yes          Sandra Rivera RN

## 2020-05-12 NOTE — OUTREACH NOTE
Call Center TCM Note      Responses   [facility] patient discharged from?  [place]   Does the patient have one of the following disease processes/diagnoses(primary or secondary)?  General Surgery   TCM attempt successful?  Yes   Call start time  1424   Call end time  1427   Discharge diagnosis  Lap brayden   Meds reviewed with patient/caregiver?  Yes   Is the patient having any side effects they believe may be caused by any medication additions or changes?  No   Does the patient have all medications related to this admission filled (includes all antibiotics, pain medications, etc.)  Yes   Is the patient taking all medications as directed (includes completed medication regime)?  Yes   Does the patient have a follow up appointment scheduled with their surgeon?  Yes   Has the patient kept scheduled appointments due by today?  N/A   Has home health visited the patient within 72 hours of discharge?  N/A   Psychosocial issues?  No   Did the patient receive a copy of their discharge instructions?  Yes   Nursing interventions  Reviewed instructions with patient   What is the patient's perception of their health status since discharge?  Improving   Nursing interventions  Nurse provided patient education   Is the patient /caregiver able to teach back basic post-op care?  Keep incision areas clean,dry and protected   Is the patient/caregiver able to teach back signs and symptoms of incisional infection?  Increased redness, swelling or pain at the incisonal site, Incisional warmth, Fever, Increased drainage or bleeding, Pus or odor from incision   Is the patient/caregiver able to teach back steps to recovery at home?  Rest and rebuild strength, gradually increase activity, Eat a well-balance diet   Is the patient/caregiver able to teach back the hierarchy of who to call/visit for symptoms/problems? PCP, Specialist, Home health nurse, Urgent Care, ED, 911  Yes   TCM call completed?  Yes          Barbara Kee,  LPN    5/12/2020, 14:27

## 2020-05-19 ENCOUNTER — OFFICE VISIT (OUTPATIENT)
Dept: SURGERY | Facility: CLINIC | Age: 35
End: 2020-05-19

## 2020-05-19 VITALS
HEIGHT: 62 IN | BODY MASS INDEX: 26.46 KG/M2 | SYSTOLIC BLOOD PRESSURE: 118 MMHG | HEART RATE: 77 BPM | DIASTOLIC BLOOD PRESSURE: 72 MMHG | TEMPERATURE: 98.1 F | WEIGHT: 143.8 LBS

## 2020-05-19 DIAGNOSIS — K81.0 ACUTE CHOLECYSTITIS: Primary | ICD-10-CM

## 2020-05-19 PROCEDURE — 99024 POSTOP FOLLOW-UP VISIT: CPT | Performed by: SURGERY

## 2020-05-19 NOTE — PROGRESS NOTES
34-year-old female who is now a week status post laparoscopic cholecystectomy normal operative cholangiogram for acute cholecystitis.  Patient is doing well.  She is tolerating regular diet.  Having normal bowel function.  Denies any abdominal pain.  Her incisions are clean appear to be healing nicely.  Her abdomen is soft.  She is nonicteric.  She can return to work next Monday without restriction.  She will follow-up with us on a as needed basis

## 2020-05-19 NOTE — PATIENT INSTRUCTIONS

## 2020-07-13 DIAGNOSIS — R92.8 ABNORMAL MAMMOGRAM OF LEFT BREAST: Primary | ICD-10-CM

## 2020-07-13 NOTE — PROGRESS NOTES
BIRAD 4 mammogram suggests biopsy. Pt would like to see Dr. Ramachandran. She recently saw him for bonnie owen.

## 2020-07-15 ENCOUNTER — OFFICE VISIT (OUTPATIENT)
Dept: SURGERY | Facility: CLINIC | Age: 35
End: 2020-07-15

## 2020-07-15 VITALS
TEMPERATURE: 98.8 F | DIASTOLIC BLOOD PRESSURE: 74 MMHG | WEIGHT: 145 LBS | BODY MASS INDEX: 26.68 KG/M2 | HEIGHT: 62 IN | HEART RATE: 74 BPM | SYSTOLIC BLOOD PRESSURE: 118 MMHG

## 2020-07-15 DIAGNOSIS — R92.8 ABNORMAL MAMMOGRAM OF LEFT BREAST: Primary | ICD-10-CM

## 2020-07-15 PROCEDURE — 99213 OFFICE O/P EST LOW 20 MIN: CPT | Performed by: SURGERY

## 2020-07-15 RX ORDER — TRETINOIN 0.025 %
CREAM (GRAM) TOPICAL
COMMUNITY
Start: 2020-06-26 | End: 2021-01-13

## 2020-07-15 RX ORDER — LIDOCAINE HYDROCHLORIDE AND EPINEPHRINE 10; 10 MG/ML; UG/ML
20 INJECTION, SOLUTION INFILTRATION; PERINEURAL ONCE
Status: CANCELLED | OUTPATIENT
Start: 2020-07-15 | End: 2020-07-15

## 2020-07-15 NOTE — H&P (VIEW-ONLY)
35-year-old female we have seen in the past for different problem.  She recent underwent her first ever screening mammogram of both breast and then a diagnostic left breast mammogram demonstrated a 4.6 x 4.6 x 9.5 mm hypoechoic lesion at 4 o'clock position.  Best seen by ultrasound.  No family history of any issues with the breast.  Patient does not feel anything in her breast.  She never had a mammogram before.  She denies any nipple discharge any pain in her breast.  No history of trauma to her breast.     Vitals:    07/15/20 1508   BP: 118/74   Pulse: 74   Temp: 98.8 °F (37.1 °C)       Allergies: No Known Allergies    Home Medications:  Prior to Admission medications    Medication Sig Start Date End Date Taking? Authorizing Provider   RETIN-A 0.025 % cream APPLY CREAM EXTERNALLY TO AFFECTED AREA AT BEDTIME 6/26/20  Yes Radha Dutton MD   HYDROcodone-acetaminophen (NORCO) 7.5-325 MG per tablet Take 1 tablet by mouth Every 6 (Six) Hours As Needed for Moderate Pain  (Pain). 5/11/20 7/15/20  Anton Ramachandran MD   ondansetron ODT (ZOFRAN-ODT) 4 MG disintegrating tablet  2/7/20 7/15/20  ProviderRadha MD       Social History     Socioeconomic History   • Marital status:      Spouse name: Not on file   • Number of children: Not on file   • Years of education: Not on file   • Highest education level: Not on file   Tobacco Use   • Smoking status: Never Smoker   • Smokeless tobacco: Never Used   Substance and Sexual Activity   • Alcohol use: No   • Drug use: No   • Sexual activity: Yes     Partners: Male     Birth control/protection: None       Past Medical History:   Diagnosis Date   • Abnormal glucose tolerance test during pregnancy, not yet delivered     baby not yet delivered   • Absence of menstruation    • Dietary counseling and surveillance    • Encounter for routine gynecological examination    • Need for prophylactic vaccination and inoculation against influenza    • Routine postpartum  follow-up    • Tuberculosis screening    • Vaginitis and vulvovaginitis    • Visit for gynecologic examination        Family History   Problem Relation Age of Onset   • Breast cancer Other    • Diabetes Other    • Hypertension Other        Past Surgical History:   Procedure Laterality Date   • CHOLECYSTECTOMY WITH INTRAOPERATIVE CHOLANGIOGRAM N/A 5/11/2020    Procedure: CHOLECYSTECTOMY LAPAROSCOPIC INTRAOPERATIVE CHOLANGIOGRAM;  Surgeon: Anton Ramachandran MD;  [facility]: [facility];  Service: General;  Laterality: N/A;   • DIAGNOSTIC LAPAROSCOPY     • PAP SMEAR  03/08/2011     Negative      Review of systems  Denies chest pain  Denies shortness of breath  Denies any nausea or vomiting  No abdominal pain  No urinary complaints      Alert appropriate nontoxic  Trachea midline  Lungs clear  Heart regular rate and rhythm  Abdomen soft  Breast symmetric without any palpable masses nipple discharge skin changes or associated adenopathy.  Extremities unremarkable no arm swelling  Skin is warm and dry      Reviewed mammograms and ultrasound with patient    Recommend ultrasound-guided left breast mammotome biopsy and leave a clip..  Fully discussed the procedure alternatives risk benefits with the patient she clearly understands and wishes to proceed

## 2020-07-15 NOTE — PROGRESS NOTES
35-year-old female we have seen in the past for different problem.  She recent underwent her first ever screening mammogram of both breast and then a diagnostic left breast mammogram demonstrated a 4.6 x 4.6 x 9.5 mm hypoechoic lesion at 4 o'clock position.  Best seen by ultrasound.  No family history of any issues with the breast.  Patient does not feel anything in her breast.  She never had a mammogram before.  She denies any nipple discharge any pain in her breast.  No history of trauma to her breast.     Vitals:    07/15/20 1508   BP: 118/74   Pulse: 74   Temp: 98.8 °F (37.1 °C)       Allergies: No Known Allergies    Home Medications:  Prior to Admission medications    Medication Sig Start Date End Date Taking? Authorizing Provider   RETIN-A 0.025 % cream APPLY CREAM EXTERNALLY TO AFFECTED AREA AT BEDTIME 6/26/20  Yes Radha Dutton MD   HYDROcodone-acetaminophen (NORCO) 7.5-325 MG per tablet Take 1 tablet by mouth Every 6 (Six) Hours As Needed for Moderate Pain  (Pain). 5/11/20 7/15/20  Anton Ramachandran MD   ondansetron ODT (ZOFRAN-ODT) 4 MG disintegrating tablet  2/7/20 7/15/20  ProviderRadha MD       Social History     Socioeconomic History   • Marital status:      Spouse name: Not on file   • Number of children: Not on file   • Years of education: Not on file   • Highest education level: Not on file   Tobacco Use   • Smoking status: Never Smoker   • Smokeless tobacco: Never Used   Substance and Sexual Activity   • Alcohol use: No   • Drug use: No   • Sexual activity: Yes     Partners: Male     Birth control/protection: None       Past Medical History:   Diagnosis Date   • Abnormal glucose tolerance test during pregnancy, not yet delivered     baby not yet delivered   • Absence of menstruation    • Dietary counseling and surveillance    • Encounter for routine gynecological examination    • Need for prophylactic vaccination and inoculation against influenza    • Routine postpartum  follow-up    • Tuberculosis screening    • Vaginitis and vulvovaginitis    • Visit for gynecologic examination        Family History   Problem Relation Age of Onset   • Breast cancer Other    • Diabetes Other    • Hypertension Other        Past Surgical History:   Procedure Laterality Date   • CHOLECYSTECTOMY WITH INTRAOPERATIVE CHOLANGIOGRAM N/A 5/11/2020    Procedure: CHOLECYSTECTOMY LAPAROSCOPIC INTRAOPERATIVE CHOLANGIOGRAM;  Surgeon: Anton Ramachandran MD;  Location: [facility];  Service: General;  Laterality: N/A;   • DIAGNOSTIC LAPAROSCOPY     • PAP SMEAR  03/08/2011     Negative      Review of systems  Denies chest pain  Denies shortness of breath  Denies any nausea or vomiting  No abdominal pain  No urinary complaints      Alert appropriate nontoxic  Trachea midline  Lungs clear  Heart regular rate and rhythm  Abdomen soft  Breast symmetric without any palpable masses nipple discharge skin changes or associated adenopathy.  Extremities unremarkable no arm swelling  Skin is warm and dry      Reviewed mammograms and ultrasound with patient    Recommend ultrasound-guided left breast mammotome biopsy and leave a clip..  Fully discussed the procedure alternatives risk benefits with the patient she clearly understands and wishes to proceed

## 2020-07-16 ENCOUNTER — TRANSCRIBE ORDERS (OUTPATIENT)
Dept: GENERAL RADIOLOGY | Facility: HOSPITAL | Age: 35
End: 2020-07-16

## 2020-07-16 DIAGNOSIS — Z01.818 PRE-OP TESTING: Primary | ICD-10-CM

## 2020-07-18 ENCOUNTER — LAB (OUTPATIENT)
Dept: LAB | Facility: HOSPITAL | Age: 35
End: 2020-07-18

## 2020-07-18 DIAGNOSIS — Z01.818 PRE-OP TESTING: ICD-10-CM

## 2020-07-18 PROCEDURE — C9803 HOPD COVID-19 SPEC COLLECT: HCPCS

## 2020-07-18 PROCEDURE — U0003 INFECTIOUS AGENT DETECTION BY NUCLEIC ACID (DNA OR RNA); SEVERE ACUTE RESPIRATORY SYNDROME CORONAVIRUS 2 (SARS-COV-2) (CORONAVIRUS DISEASE [COVID-19]), AMPLIFIED PROBE TECHNIQUE, MAKING USE OF HIGH THROUGHPUT TECHNOLOGIES AS DESCRIBED BY CMS-2020-01-R: HCPCS

## 2020-07-19 LAB
COVID LABCORP PRIORITY: NORMAL
SARS-COV-2 RNA RESP QL NAA+PROBE: NOT DETECTED

## 2020-07-21 ENCOUNTER — HOSPITAL ENCOUNTER (OUTPATIENT)
Dept: ULTRASOUND IMAGING | Facility: HOSPITAL | Age: 35
Discharge: HOME | End: 2020-07-21
Admitting: SURGERY

## 2020-07-21 VITALS
BODY MASS INDEX: 26.37 KG/M2 | HEIGHT: 62 IN | TEMPERATURE: 98.3 F | SYSTOLIC BLOOD PRESSURE: 141 MMHG | OXYGEN SATURATION: 100 % | WEIGHT: 143.3 LBS | HEART RATE: 93 BPM | DIASTOLIC BLOOD PRESSURE: 81 MMHG | RESPIRATION RATE: 18 BRPM

## 2020-07-21 DIAGNOSIS — R92.8 ABNORMAL MAMMOGRAM OF LEFT BREAST: ICD-10-CM

## 2020-07-21 PROCEDURE — 19083 BX BREAST 1ST LESION US IMAG: CPT | Performed by: SURGERY

## 2020-07-21 PROCEDURE — A4648 IMPLANTABLE TISSUE MARKER: HCPCS

## 2020-07-21 RX ORDER — IBUPROFEN 600 MG/1
600 TABLET ORAL EVERY 6 HOURS PRN
Status: CANCELLED | OUTPATIENT
Start: 2020-07-21

## 2020-07-21 RX ORDER — LIDOCAINE HYDROCHLORIDE AND EPINEPHRINE 10; 10 MG/ML; UG/ML
20 INJECTION, SOLUTION INFILTRATION; PERINEURAL ONCE
Status: COMPLETED | OUTPATIENT
Start: 2020-07-21 | End: 2020-07-21

## 2020-07-21 RX ADMIN — LIDOCAINE HYDROCHLORIDE,EPINEPHRINE BITARTRATE 15 ML: 10; .01 INJECTION, SOLUTION INFILTRATION; PERINEURAL at 08:42

## 2020-07-21 NOTE — INTERVAL H&P NOTE
H&P reviewed. The patient was examined and there are no changes to the H&P.      Temp:  [98.5 °F (36.9 °C)] 98.5 °F (36.9 °C)  Heart Rate:  [71] 71  Resp:  [18] 18  BP: (118)/(76) 118/76

## 2020-07-22 LAB
LAB AP CASE REPORT: NORMAL
PATH REPORT.FINAL DX SPEC: NORMAL

## 2020-07-27 ENCOUNTER — OFFICE VISIT (OUTPATIENT)
Dept: SURGERY | Facility: CLINIC | Age: 35
End: 2020-07-27

## 2020-07-27 DIAGNOSIS — D24.2 FIBROADENOMA OF LEFT BREAST: Primary | ICD-10-CM

## 2020-07-27 PROCEDURE — 99024 POSTOP FOLLOW-UP VISIT: CPT | Performed by: SURGERY

## 2020-07-27 NOTE — PROGRESS NOTES
Spoke with patient by phone due to the code situation.  She is now 6 days status post left breast ultrasound-guided mammotome biopsy and left a clip.  She did well with the biopsy.  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  This is in concordance with her clinical findings.  I went over all that with her answered all of her questions.  She denies any problems with pain or any swelling or any hematoma.  We will obtain a left breast mammogram in 3 months return to clinic after the study or sooner if she has any other concerns or questions

## 2020-10-05 ENCOUNTER — OFFICE VISIT (OUTPATIENT)
Dept: SURGERY | Facility: CLINIC | Age: 35
End: 2020-10-05

## 2020-10-05 VITALS
HEART RATE: 84 BPM | TEMPERATURE: 97.8 F | BODY MASS INDEX: 26.68 KG/M2 | WEIGHT: 145 LBS | HEIGHT: 62 IN | SYSTOLIC BLOOD PRESSURE: 130 MMHG | DIASTOLIC BLOOD PRESSURE: 82 MMHG

## 2020-10-05 DIAGNOSIS — Z86.018 S/P EXCISION OF FIBROADENOMA OF BREAST: ICD-10-CM

## 2020-10-05 DIAGNOSIS — Z98.890 S/P EXCISION OF FIBROADENOMA OF BREAST: ICD-10-CM

## 2020-10-05 DIAGNOSIS — Z12.31 SCREENING MAMMOGRAM FOR HIGH-RISK PATIENT: Primary | ICD-10-CM

## 2020-10-05 PROBLEM — R92.8 ABNORMAL MAMMOGRAM OF LEFT BREAST: Status: RESOLVED | Noted: 2020-07-15 | Resolved: 2020-10-05

## 2020-10-05 PROCEDURE — 99212 OFFICE O/P EST SF 10 MIN: CPT | Performed by: SURGERY

## 2020-10-05 NOTE — PROGRESS NOTES
35-year-old female who is now 3 months status post left breast mammotome biopsy of a fibroadenoma.  That was patient's first mammogram but this was picked up on.  She has no family history of any first-degree relatives advised breast cancer or ovarian cancer.  Follow-up by left breast mammogram demonstrates clip to be in good position and the study is unremarkable and I agree with radiologist BI-RADS 2.  Went over the mammogram with the patient answered all of her questions.    Breasts are symmetric there is no palpable masses no nipple discharge and no adenopathy and no skin changes  Axilla is unremarkable  Skin is warm and dry  Neck is unremarkable    Assessment and plan  Status post excision of a fibroadenoma on mammotome biopsy of left breast.  Recommend bilateral mammogram in 9 months return to clinic after the study or sooner if she has any other concerns or questions

## 2020-10-05 NOTE — PATIENT INSTRUCTIONS

## 2020-10-31 ENCOUNTER — HOSPITAL ENCOUNTER (EMERGENCY)
Facility: HOSPITAL | Age: 35
Discharge: HOME | End: 2020-10-31
Attending: EMERGENCY MEDICINE | Admitting: EMERGENCY MEDICINE

## 2020-10-31 ENCOUNTER — APPOINTMENT (OUTPATIENT)
Dept: GENERAL RADIOLOGY | Facility: HOSPITAL | Age: 35
End: 2020-10-31

## 2020-10-31 VITALS
SYSTOLIC BLOOD PRESSURE: 153 MMHG | HEART RATE: 98 BPM | TEMPERATURE: 98.8 F | WEIGHT: 145 LBS | OXYGEN SATURATION: 98 % | HEIGHT: 62 IN | BODY MASS INDEX: 26.68 KG/M2 | DIASTOLIC BLOOD PRESSURE: 90 MMHG | RESPIRATION RATE: 18 BRPM

## 2020-10-31 DIAGNOSIS — S92.355A CLOSED NONDISPLACED FRACTURE OF FIFTH METATARSAL BONE OF LEFT FOOT, INITIAL ENCOUNTER: Primary | ICD-10-CM

## 2020-10-31 PROCEDURE — 99284 EMERGENCY DEPT VISIT MOD MDM: CPT

## 2020-10-31 PROCEDURE — 73630 X-RAY EXAM OF FOOT: CPT

## 2020-10-31 RX ORDER — IBUPROFEN 600 MG/1
600 TABLET ORAL EVERY 8 HOURS PRN
Qty: 21 TABLET | Refills: 0 | Status: SHIPPED | OUTPATIENT
Start: 2020-10-31 | End: 2021-01-13

## 2020-10-31 RX ORDER — IBUPROFEN 600 MG/1
600 TABLET ORAL ONCE
Status: COMPLETED | OUTPATIENT
Start: 2020-10-31 | End: 2020-10-31

## 2020-10-31 RX ADMIN — IBUPROFEN 600 MG: 600 TABLET, FILM COATED ORAL at 18:03

## 2020-11-03 DIAGNOSIS — M79.672 LEFT FOOT PAIN: Primary | ICD-10-CM

## 2020-11-05 ENCOUNTER — OFFICE VISIT (OUTPATIENT)
Dept: ORTHOPEDIC SURGERY | Facility: CLINIC | Age: 35
End: 2020-11-05

## 2020-11-05 VITALS — HEIGHT: 62 IN | WEIGHT: 145 LBS | BODY MASS INDEX: 26.68 KG/M2

## 2020-11-05 DIAGNOSIS — M79.672 LEFT FOOT PAIN: ICD-10-CM

## 2020-11-05 DIAGNOSIS — S92.352A FRACTURE OF BASE OF FIFTH METATARSAL BONE OF LEFT FOOT, CLOSED, INITIAL ENCOUNTER: Primary | ICD-10-CM

## 2020-11-05 PROCEDURE — 28470 CLTX METATARSAL FX WO MNP EA: CPT | Performed by: ORTHOPAEDIC SURGERY

## 2020-11-05 PROCEDURE — 99202 OFFICE O/P NEW SF 15 MIN: CPT | Performed by: ORTHOPAEDIC SURGERY

## 2020-11-05 NOTE — PROGRESS NOTES
Rachel Salgado is a 35 y.o. female   Primary provider:  Dalia Patel APRN       Chief Complaint   Patient presents with   • Left Foot - Initial Evaluation       HISTORY OF PRESENT ILLNESS:  Patient in for initial eval of left foot pain.  Xray completed upon arrival  Patient states, no change since being seen in the ed 10/31/2020.    Patient states that she stepped on the side of a curb while trick-or-treating with her child on the 31st.  She had immediate pain on the outside of her right foot.  She was evaluated in the emergency department and found to have a fracture of the base of the fifth metatarsal.  She was placed in a boot and sent for definitive care.  Her pain is somewhat better since being in the boot.  She still has a constant dull ache and she has sharp pains with certain motion and with unrestricted weightbearing.  No numbness or tingling.  No fevers or chills.    Foot Injury   The incident occurred 5 to 7 days ago. Injury mechanism: misstepped off curb. The pain is present in the left foot. The quality of the pain is described as aching. The pain is mild. Associated symptoms comments: Bruising and swelling. She reports no foreign bodies present. She has tried acetaminophen, NSAIDs, ice and heat for the symptoms.        CONCURRENT MEDICAL HISTORY:    Past Medical History:   Diagnosis Date   • Abnormal glucose tolerance test during pregnancy, not yet delivered     baby not yet delivered   • Absence of menstruation    • Dietary counseling and surveillance    • Encounter for routine gynecological examination    • Need for prophylactic vaccination and inoculation against influenza    • Routine postpartum follow-up    • Tuberculosis screening    • Vaginitis and vulvovaginitis    • Visit for gynecologic examination        No Known Allergies      Current Outpatient Medications:   •  ibuprofen (ADVIL,MOTRIN) 600 MG tablet, Take 1 tablet by mouth Every 8 (Eight) Hours As Needed for Moderate Pain ., Disp:  "21 tablet, Rfl: 0  •  RETIN-A 0.025 % cream, APPLY CREAM EXTERNALLY TO AFFECTED AREA AT BEDTIME, Disp: , Rfl:     Past Surgical History:   Procedure Laterality Date   • BREAST BIOPSY Left    • CHOLECYSTECTOMY WITH INTRAOPERATIVE CHOLANGIOGRAM N/A 5/11/2020    Procedure: CHOLECYSTECTOMY LAPAROSCOPIC INTRAOPERATIVE CHOLANGIOGRAM;  Surgeon: Anton Ramachandran MD;  Location: [facility];  Service: General;  Laterality: N/A;   • DIAGNOSTIC LAPAROSCOPY     • PAP SMEAR  03/08/2011     Negative        Family History   Problem Relation Age of Onset   • Breast cancer Other    • Diabetes Other    • Hypertension Other        Social History     Socioeconomic History   • Marital status:      Spouse name: Not on file   • Number of children: Not on file   • Years of education: Not on file   • Highest education level: Not on file   Tobacco Use   • Smoking status: Never Smoker   • Smokeless tobacco: Never Used   Substance and Sexual Activity   • Alcohol use: No   • Drug use: No   • Sexual activity: Yes     Partners: Male     Birth control/protection: None        Review of Systems   Constitutional: Negative.    HENT: Negative.    Eyes: Negative.    Respiratory: Negative.    Cardiovascular: Negative.    Gastrointestinal: Negative.    Endocrine: Negative.    Genitourinary: Negative.    Musculoskeletal:        Right foot pain   Skin: Negative.    Allergic/Immunologic: Negative.    Neurological: Negative.    Hematological: Negative.    Psychiatric/Behavioral: Negative.        PHYSICAL EXAMINATION:       Ht 157.5 cm (62\")   Wt 65.8 kg (145 lb)   BMI 26.52 kg/m²     Physical Exam  Constitutional:       General: She is not in acute distress.     Appearance: Normal appearance. She is well-developed. She is not ill-appearing.   Pulmonary:      Effort: Pulmonary effort is normal. No respiratory distress.   Neurological:      Mental Status: She is alert and oriented to person, place, and time.   Psychiatric:         Mood and Affect: Mood " normal.         Behavior: Behavior normal.         Thought Content: Thought content normal.         Judgment: Judgment normal.         GAIT:     []  Normal  [x]  Antalgic    Assistive device: []  None  []  Walker     [x]  Crutches  []  Cane     [x]  Wheelchair  []  Stretcher    Left Ankle Exam     Tenderness   Left ankle tenderness location: Tender on the lateral aspect of her foot.  Mild swelling.   Swelling: mild    Other   Erythema: absent  Sensation: normal  Pulse: present    Comments:  Tender range of motion.  Strength and stability is deferred due to known fracture.                  Xr Foot 3+ View Left    Result Date: 11/5/2020  Narrative: Ordering Provider:  Kevyn Anderson MD Ordering Diagnosis/Indication:  Left foot pain Procedure:  XR FOOT 3+ VW LEFT Exam Date:  11/5/20 COMPARISON:  Todays X-rays were compared to previous images dated October 31, 2020.     Impression:  3 views of the left foot show acceptable position alignment of a base of the fifth metatarsal fracture.  No change in alignment as compared to prior x-ray.  No other acute bony abnormality is noted.  Mild displacement is present but unchanged from prior x-ray. Kevyn Anderson MD 11/5/20     Xr Foot 3+ View Left    Result Date: 10/31/2020  Narrative: PROCEDURE: XR FOOT 3 OR MORE VIEWS CLINICAL HISTORY: trauma INDICATION: Same as above COMPARISON: None . TECHNIQUE: Three Views of the left foot were done. FINDINGS: There is a minimally displaced transverse fracture at the base of the fifth metatarsal bone The joint spaces are relatively well-maintained. There is no evidence of bony tarsal coalition. The soft tissues are radiographically unremarkable. There is no visualization of any radiopaque foreign bodies in the visualized soft tissues.     Impression: There is a minimally displaced transverse fracture at the base of the fifth metatarsal bone  Electronically signed by:  Kimani Moreau MD  10/31/2020 6:58 PM CDT Workstation:  [id number]          ASSESSMENT:    Diagnoses and all orders for this visit:    Fracture of base of fifth metatarsal bone of left foot, closed, initial encounter    Left foot pain          PLAN    We discussed continued ice and elevation.  Continue with use of the boot and crutches for weightbearing support.  Slowly advance weightbearing as tolerated.  No surgical indication at this time.  We will repeat x-rays in approximately 7 to 10 days to assess for fracture shifting and reassess for possible surgical intervention.  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  However, she will be off work at this time until she can manage mobility reasonably.    Patient's Body mass index is 26.52 kg/m². BMI is above normal parameters. Recommendations include: exercise counseling and nutrition counseling.    Return in about 1 week (around 11/12/2020) for Recheck with repeat xrays.    Kevyn Anderson MD

## 2020-11-16 DIAGNOSIS — S92.352A FRACTURE OF BASE OF FIFTH METATARSAL BONE OF LEFT FOOT, CLOSED, INITIAL ENCOUNTER: Primary | ICD-10-CM

## 2020-11-17 ENCOUNTER — OFFICE VISIT (OUTPATIENT)
Dept: ORTHOPEDIC SURGERY | Facility: CLINIC | Age: 35
End: 2020-11-17

## 2020-11-17 VITALS — WEIGHT: 147 LBS | BODY MASS INDEX: 27.05 KG/M2 | HEIGHT: 62 IN

## 2020-11-17 DIAGNOSIS — S92.352D CLOSED DISPLACED FRACTURE OF FIFTH METATARSAL BONE OF LEFT FOOT WITH ROUTINE HEALING, SUBSEQUENT ENCOUNTER: Primary | ICD-10-CM

## 2020-11-17 PROCEDURE — 99024 POSTOP FOLLOW-UP VISIT: CPT | Performed by: ORTHOPAEDIC SURGERY

## 2020-11-17 NOTE — PROGRESS NOTES
"The patient is a 35 y.o. female who presents for followup.    Chief Complaint   Patient presents with   • Left Foot - Follow-up, Fracture       HPI:  F/u left 5th metatarsal fx, repeat xrays done today. Patient wearing walking boot.   Pain is improving  Slowly adding more weight      Current Outpatient Medications:   •  ibuprofen (ADVIL,MOTRIN) 600 MG tablet, Take 1 tablet by mouth Every 8 (Eight) Hours As Needed for Moderate Pain ., Disp: 21 tablet, Rfl: 0  •  RETIN-A 0.025 % cream, APPLY CREAM EXTERNALLY TO AFFECTED AREA AT BEDTIME, Disp: , Rfl:     No Known Allergies     ROS:  No fevers or chills.  No nausea or vomiting    PHYSICAL EXAM:    Vitals:    11/17/20 1008   Weight: 66.7 kg (147 lb)   Height: 157.5 cm (62\")   PainSc: 0-No pain       GAIT:     []  Normal  [x]  Antalgic    Assistive device:   []  None    []  Walker     [x]  Crutches    []  Cane     []  Wheelchair    []  Stretcher    Patient is awake and alert, answers questions appropriately, and is in no apparent distress.    Still with mild tenderness at base of 5th metatarsal  Mild swelling  Good distal pulses and sensation  Good cap refill    Xr Foot 3+ View Left    Result Date: 11/18/2020  Narrative: Ordering Provider:  Kevyn Anderson MD Ordering Diagnosis/Indication:  Fracture of base of fifth metatarsal bone of left foot, closed, initial encounter Procedure:  XR FOOT 3+ VW LEFT Exam Date:  11/17/20 COMPARISON:  Todays X-rays were compared to previous images dated November 5, 2020.     Impression:  3 views of the left foot show acceptable position alignment of a base of the fifth metatarsal fracture.  The peroneal insertion is disrupted but is unchanged from prior x-ray.  No further displacement is noted in comparison to prior x-ray.  No other acute bony abnormality. Kevyn Anderson MD 11/17/20     Xr Foot 3+ View Left    Result Date: 11/5/2020  Narrative: Ordering Provider:  Kevyn Anderson MD Ordering Diagnosis/Indication:  " Left foot pain Procedure:  XR FOOT 3+ VW LEFT Exam Date:  11/5/20 COMPARISON:  Todays X-rays were compared to previous images dated October 31, 2020.     Impression:  3 views of the left foot show acceptable position alignment of a base of the fifth metatarsal fracture.  No change in alignment as compared to prior x-ray.  No other acute bony abnormality is noted.  Mild displacement is present but unchanged from prior x-ray. Kevyn Anderson MD 11/5/20     Xr Foot 3+ View Left    Result Date: 10/31/2020  Narrative: PROCEDURE: XR FOOT 3 OR MORE VIEWS CLINICAL HISTORY: trauma INDICATION: Same as above COMPARISON: None . TECHNIQUE: Three Views of the left foot were done. FINDINGS: There is a minimally displaced transverse fracture at the base of the fifth metatarsal bone The joint spaces are relatively well-maintained. There is no evidence of bony tarsal coalition. The soft tissues are radiographically unremarkable. There is no visualization of any radiopaque foreign bodies in the visualized soft tissues.     Impression: There is a minimally displaced transverse fracture at the base of the fifth metatarsal bone  Electronically signed by:  Kimani Moreau MD  10/31/2020 6:58 PM CDT Workstation: [id number]      ASSESSMENT:  Diagnoses and all orders for this visit:    Closed displaced fracture of fifth metatarsal bone of left foot with routine healing, subsequent encounter        PLAN:    We discussed slow progress.  Slowly adding weight as pain allows.  We discussed return back to work with six hour shifts until 11/30/2020 then patient may resume regular shifts.  Recheck in 3 weeks with repeat x-rays.    Return in about 3 weeks (around 12/8/2020) for Recheck with repeat xrays.    Kevyn Anderson MD

## 2020-12-08 DIAGNOSIS — S92.352D CLOSED DISPLACED FRACTURE OF FIFTH METATARSAL BONE OF LEFT FOOT WITH ROUTINE HEALING, SUBSEQUENT ENCOUNTER: Primary | ICD-10-CM

## 2020-12-08 DIAGNOSIS — S92.352A FRACTURE OF BASE OF FIFTH METATARSAL BONE OF LEFT FOOT, CLOSED, INITIAL ENCOUNTER: ICD-10-CM

## 2020-12-09 ENCOUNTER — OFFICE VISIT (OUTPATIENT)
Dept: ORTHOPEDIC SURGERY | Facility: CLINIC | Age: 35
End: 2020-12-09

## 2020-12-09 VITALS — HEIGHT: 62 IN | BODY MASS INDEX: 27.05 KG/M2 | WEIGHT: 147 LBS

## 2020-12-09 DIAGNOSIS — S92.352D CLOSED DISPLACED FRACTURE OF FIFTH METATARSAL BONE OF LEFT FOOT WITH ROUTINE HEALING, SUBSEQUENT ENCOUNTER: Primary | ICD-10-CM

## 2020-12-09 PROCEDURE — 99024 POSTOP FOLLOW-UP VISIT: CPT | Performed by: ORTHOPAEDIC SURGERY

## 2020-12-09 NOTE — PROGRESS NOTES
"The patient is a 35 y.o. female who presents for followup.    Chief Complaint   Patient presents with   • Left Foot - Follow-up, Fracture       HPI:  F/u left foot, xrays done today. Patient wearing walking boot and using crutches.   Pain is improved  Putting more weight on her foot now  No numbness or tingling      Current Outpatient Medications:   •  ibuprofen (ADVIL,MOTRIN) 600 MG tablet, Take 1 tablet by mouth Every 8 (Eight) Hours As Needed for Moderate Pain ., Disp: 21 tablet, Rfl: 0  •  RETIN-A 0.025 % cream, APPLY CREAM EXTERNALLY TO AFFECTED AREA AT BEDTIME, Disp: , Rfl:     No Known Allergies     ROS:  No fevers or chills.  No nausea or vomiting    PHYSICAL EXAM:    Vitals:    12/09/20 1028   Weight: 66.7 kg (147 lb)   Height: 157.5 cm (62\")       GAIT:     []  Normal  [x]  Antalgic    Assistive device:   []  None    []  Walker     [x]  Crutches    []  Cane     []  Wheelchair    []  Stretcher    Patient is awake and alert, answers questions appropriately, and is in no apparent distress.    Still with mild tenderness lateral aspect of foot.  Good ankle motion  Minimal swelling   Good distal pulses/sensation    Xr Foot 3+ View Left    Result Date: 12/10/2020  Narrative: Ordering Provider:  Kevyn Anderson MD Ordering Diagnosis/Indication:  Closed displaced fracture of fifth metatarsal bone of left foot with routine healing, subsequent encounter, Fracture of base of fifth metatarsal bone of left foot, closed, initial encounter Procedure:  XR FOOT 3+ VW LEFT Exam Date:  12/9/20 COMPARISON:  Todays X-rays were compared to previous images dated November 17, 2020.     Impression:  3 views of the left foot show acceptable position and alignment of a base of the fifth metatarsal fracture.  No sign of displacement of the fracture is noted.  Progressive healing noted on each view.  No other acute findings. Kevyn Anderson MD 12/9/20     Xr Foot 3+ View Left    Result Date: 11/18/2020  Narrative: " Ordering Provider:  Kevyn Anderson MD Ordering Diagnosis/Indication:  Fracture of base of fifth metatarsal bone of left foot, closed, initial encounter Procedure:  XR FOOT 3+ VW LEFT Exam Date:  11/17/20 COMPARISON:  Todays X-rays were compared to previous images dated November 5, 2020.     Impression:  3 views of the left foot show acceptable position alignment of a base of the fifth metatarsal fracture.  The peroneal insertion is disrupted but is unchanged from prior x-ray.  No further displacement is noted in comparison to prior x-ray.  No other acute bony abnormality. Kevyn Anderson MD 11/17/20       ASSESSMENT:  Diagnoses and all orders for this visit:    Closed displaced fracture of fifth metatarsal bone of left foot with routine healing, subsequent encounter  -     Miscellaneous DME        PLAN:    Continue to progress weightbearing  Slowly increase weight and wean the crutches and then wean the boot into a postop shoe.  We discussed also slowly transitioning into a hard soled shoe.  Recheck in 4 weeks with repeat x-rays.    Return in about 4 weeks (around 1/6/2021) for Recheck with repeat xrays.    Kevyn Anderson MD

## 2020-12-29 ENCOUNTER — APPOINTMENT (OUTPATIENT)
Dept: VACCINE CLINIC | Facility: HOSPITAL | Age: 35
End: 2020-12-29

## 2021-01-11 DIAGNOSIS — S92.352D CLOSED DISPLACED FRACTURE OF FIFTH METATARSAL BONE OF LEFT FOOT WITH ROUTINE HEALING, SUBSEQUENT ENCOUNTER: Primary | ICD-10-CM

## 2021-01-13 ENCOUNTER — OFFICE VISIT (OUTPATIENT)
Dept: ORTHOPEDIC SURGERY | Facility: CLINIC | Age: 36
End: 2021-01-13

## 2021-01-13 VITALS — WEIGHT: 142 LBS | HEIGHT: 62 IN | BODY MASS INDEX: 26.13 KG/M2

## 2021-01-13 DIAGNOSIS — S92.352D CLOSED DISPLACED FRACTURE OF FIFTH METATARSAL BONE OF LEFT FOOT WITH ROUTINE HEALING, SUBSEQUENT ENCOUNTER: Primary | ICD-10-CM

## 2021-01-13 DIAGNOSIS — M79.672 LEFT FOOT PAIN: ICD-10-CM

## 2021-01-13 PROCEDURE — 99024 POSTOP FOLLOW-UP VISIT: CPT | Performed by: ORTHOPAEDIC SURGERY

## 2021-01-13 NOTE — PATIENT INSTRUCTIONS
Rachel Salgado is a 35 y.o. female returns for     No chief complaint on file.      HISTORY OF PRESENT ILLNESS:       CONCURRENT MEDICAL HISTORY:    The following portions of the patient's history were reviewed and updated as appropriate: allergies, current medications, past family history, past medical history, past social history, past surgical history and problem list.     ROS  No fevers or chills.  No chest pain or shortness of air.  No GI or  disturbances.    PHYSICAL EXAMINATION:       There were no vitals taken for this visit.    [unfilled]    GAIT:     []  Normal  []  Antalgic    Assistive device: []  None  []  Walker     []  Crutches  []  Cane     []  Wheelchair  []  Stretcher    [unfilled]      No results found.          ASSESSMENT:    There are no diagnoses linked to this encounter.      PLAN          No follow-ups on file.    Jo Marsh, CSA

## 2021-01-13 NOTE — PROGRESS NOTES
"Rachel Salgado is a 35 y.o. female returns for     Chief Complaint   Patient presents with   • Left Foot - Follow-up       HISTORY OF PRESENT ILLNESS:  Follow up left foot, 5th metatarsal fracture.  Xray today.  Continues with post op shoe.  Pain level today 0.       CONCURRENT MEDICAL HISTORY:    The following portions of the patient's history were reviewed and updated as appropriate: allergies, current medications, past family history, past medical history, past social history, past surgical history and problem list.     ROS  No fevers or chills.  No chest pain or shortness of air.  No GI or  disturbances.    PHYSICAL EXAMINATION:       Ht 157.5 cm (62\")   Wt 64.4 kg (142 lb)   BMI 25.97 kg/m²     Physical Exam  Constitutional:       General: She is not in acute distress.     Appearance: Normal appearance. She is well-developed. She is not ill-appearing.   Pulmonary:      Effort: Pulmonary effort is normal. No respiratory distress.   Neurological:      Mental Status: She is alert and oriented to person, place, and time.   Psychiatric:         Mood and Affect: Mood normal.         Behavior: Behavior normal.         Thought Content: Thought content normal.         Judgment: Judgment normal.         GAIT:     [x]  Normal  []  Antalgic    Assistive device: [x]  None  []  Walker     []  Crutches  []  Cane     []  Wheelchair  []  Stretcher    Ortho Exam  Mild tenderness over the base of 5th metatarsal left.    Xr Foot 3+ View Left    Result Date: 1/13/2021  Narrative: Ordering Provider:  Kevyn Anderson MD Ordering Diagnosis/Indication:  Closed displaced fracture of fifth metatarsal bone of left foot with routine healing, subsequent encounter Procedure:  XR FOOT 3+ VW LEFT Exam Date:  1/13/21 COMPARISON:  Todays X-rays were compared to previous images dated 12/09/2020.     Impression:  3 views of the left foot show acceptable position alignment of the fifth metatarsal base fracture.  Progressive healing " is noted on each view.  No other acute findings are noted.  No interval change in alignment of the fragment is present. Kevyn Anderson MD 1/13/21             ASSESSMENT:    Diagnoses and all orders for this visit:    Closed displaced fracture of fifth metatarsal bone of left foot with routine healing, subsequent encounter    Left foot pain          PLAN    Slowly increase activity    continue use of protective shoe wear as needed.  Slowly add weight and exercises  Recheck in 6 weeks with xrays    Patient's Body mass index is 25.97 kg/m². BMI is within normal parameters. No follow-up required..\    Return in about 6 weeks (around 2/24/2021) for Recheck with repeat xrays.    Kevyn Anderson MD

## 2021-01-19 ENCOUNTER — APPOINTMENT (OUTPATIENT)
Dept: VACCINE CLINIC | Facility: HOSPITAL | Age: 36
End: 2021-01-19

## 2021-02-23 DIAGNOSIS — S92.352D CLOSED DISPLACED FRACTURE OF FIFTH METATARSAL BONE OF LEFT FOOT WITH ROUTINE HEALING, SUBSEQUENT ENCOUNTER: Primary | ICD-10-CM

## 2021-02-24 ENCOUNTER — OFFICE VISIT (OUTPATIENT)
Dept: ORTHOPEDIC SURGERY | Facility: CLINIC | Age: 36
End: 2021-02-24

## 2021-02-24 VITALS — BODY MASS INDEX: 34.78 KG/M2 | WEIGHT: 189 LBS | HEIGHT: 62 IN

## 2021-02-24 DIAGNOSIS — S92.352D CLOSED DISPLACED FRACTURE OF FIFTH METATARSAL BONE OF LEFT FOOT WITH ROUTINE HEALING, SUBSEQUENT ENCOUNTER: Primary | ICD-10-CM

## 2021-02-24 DIAGNOSIS — M79.672 LEFT FOOT PAIN: ICD-10-CM

## 2021-02-24 PROCEDURE — 99212 OFFICE O/P EST SF 10 MIN: CPT | Performed by: ORTHOPAEDIC SURGERY

## 2021-02-24 NOTE — PROGRESS NOTES
"Rachel Salgado is a 35 y.o. female returns for     Chief Complaint   Patient presents with   • Left Foot - Follow-up, Fracture       HISTORY OF PRESENT ILLNESS:  Follow up left foot fx. Xray today. Patient states no new problems, pain score 0/10 today  Wearing regular shoes  No longer limping         CONCURRENT MEDICAL HISTORY:    The following portions of the patient's history were reviewed and updated as appropriate: allergies, current medications, past family history, past medical history, past social history, past surgical history and problem list.     ROS  No fevers or chills.  No chest pain or shortness of air.  No GI or  disturbances.    PHYSICAL EXAMINATION:       Ht 157.5 cm (62\")   Wt 85.7 kg (189 lb)   BMI 34.57 kg/m²     Physical Exam  Constitutional:       General: She is not in acute distress.     Appearance: Normal appearance. She is well-developed. She is not ill-appearing.   Pulmonary:      Effort: Pulmonary effort is normal. No respiratory distress.   Neurological:      Mental Status: She is alert and oriented to person, place, and time.   Psychiatric:         Mood and Affect: Mood normal.         Behavior: Behavior normal.         Thought Content: Thought content normal.         Judgment: Judgment normal.         GAIT:     [x]  Normal  []  Antalgic    Assistive device: [x]  None  []  Walker     []  Crutches  []  Cane     []  Wheelchair  []  Stretcher    Left Ankle Exam     Comments:  Good motion  Mild tenderness along lateral aspect of foot              Xr Foot 3+ View Left    Result Date: 2/24/2021  Narrative: Ordering Provider:  Kevyn Anderson MD Ordering Diagnosis/Indication:  Closed displaced fracture of fifth metatarsal bone of left foot with routine healing, subsequent encounter Procedure:  XR FOOT 3+ VW LEFT Exam Date:  2/24/21 COMPARISON:  Todays X-rays were compared to previous images dated 1/13/2021.     Impression:  3 views of the left foot show acceptable position and " alignment of the base of the fifth metatarsal fracture with progressive healing.  Near complete bony consolidation is present.  Progressive healing noted in comparison to prior x-ray.  No other acute findings. Kevyn Anderson MD 2/24/21             ASSESSMENT:    Diagnoses and all orders for this visit:    Closed displaced fracture of fifth metatarsal bone of left foot with routine healing, subsequent encounter    Left foot pain          PLAN    We discussed slowly progressing strength and conditioning as tolerated.  Slowly increase activity.  Pain as guide.  No true restrictions but be cautious with increasing activity over the next 4 weeks.  Follow-up as needed.    Patient's Body mass index is 34.57 kg/m². BMI is above normal parameters. Recommendations include: exercise counseling and nutrition counseling.    Return if symptoms worsen or fail to improve, for recheck.    Kevyn Anderson MD

## 2021-03-05 ENCOUNTER — OFFICE VISIT (OUTPATIENT)
Dept: OBSTETRICS AND GYNECOLOGY | Facility: CLINIC | Age: 36
End: 2021-03-05

## 2021-03-05 VITALS
DIASTOLIC BLOOD PRESSURE: 60 MMHG | HEIGHT: 62 IN | BODY MASS INDEX: 25.58 KG/M2 | SYSTOLIC BLOOD PRESSURE: 108 MMHG | WEIGHT: 139 LBS

## 2021-03-05 DIAGNOSIS — Z01.419 WELL WOMAN EXAM WITH ROUTINE GYNECOLOGICAL EXAM: Primary | ICD-10-CM

## 2021-03-05 PROCEDURE — 99395 PREV VISIT EST AGE 18-39: CPT | Performed by: NURSE PRACTITIONER

## 2021-03-05 NOTE — PROGRESS NOTES
Subjective   Rachel Salgado is a 35 y.o. presents for gyn exam. No concerns at this time.    LMP- 1/19  Last pap- 5/2/17 NIL neg hrHPV  Last mammo- 7/7/2020 BIRADS 0. H/O excision of fibroadenoma of left breast in 2020    Gynecologic Exam  The patient's pertinent negatives include no genital itching, genital lesions, genital odor, genital rash, missed menses, pelvic pain, vaginal bleeding or vaginal discharge. Pertinent negatives include no abdominal pain, chills, constipation, diarrhea, dysuria, fever, frequency or urgency. She is sexually active. No, her partner does not have an STD. She uses nothing for contraception. Her menstrual history has been irregular.       The following portions of the patient's history were reviewed and updated as appropriate: allergies, current medications, past family history, past medical history, past social history, past surgical history and problem list.    Review of Systems   Constitutional: Negative for activity change, appetite change, chills, diaphoresis, fatigue, fever, unexpected weight gain and unexpected weight loss.   Respiratory: Negative for chest tightness and shortness of breath.    Cardiovascular: Negative for chest pain and palpitations.   Gastrointestinal: Negative for abdominal distention, abdominal pain, constipation and diarrhea.   Endocrine: Negative.    Genitourinary: Negative for amenorrhea, breast discharge, breast lump, breast pain, decreased libido, difficulty urinating, dyspareunia, dysuria, frequency, menstrual problem, missed menses, pelvic pain, pelvic pressure, urgency, urinary incontinence, vaginal bleeding, vaginal discharge and vaginal pain.   Musculoskeletal: Negative for myalgias.   Skin: Negative for color change, dry skin and skin lesions.   Neurological: Negative for light-headedness and headache.   Psychiatric/Behavioral: Negative for agitation, dysphoric mood, sleep disturbance, depressed mood and stress. The patient is not  nervous/anxious.        Objective   Physical Exam  Vitals signs and nursing note reviewed.   Constitutional:       General: She is awake. She is not in acute distress.     Appearance: Normal appearance. She is well-developed, well-groomed and normal weight. She is not ill-appearing, toxic-appearing or diaphoretic.   Neck:      Thyroid: No thyroid mass, thyromegaly or thyroid tenderness.   Cardiovascular:      Rate and Rhythm: Normal rate and regular rhythm.      Heart sounds: Normal heart sounds.   Pulmonary:      Effort: Pulmonary effort is normal.      Breath sounds: Normal breath sounds.   Chest:      Breasts: Waldo Score is 5. Breasts are symmetrical.         Right: Normal. No swelling, bleeding, inverted nipple, mass, nipple discharge, skin change or tenderness.         Left: Normal. No swelling, bleeding, inverted nipple, mass, nipple discharge, skin change or tenderness.   Abdominal:      General: Bowel sounds are normal. There is no distension.      Palpations: Abdomen is soft.      Tenderness: There is no abdominal tenderness.   Genitourinary:     General: Normal vulva.      Exam position: Lithotomy position.      Waldo stage (genital): 5.      Labia:         Right: No rash, tenderness, lesion or injury.         Left: No rash, tenderness, lesion or injury.       Urethra: No prolapse, urethral pain, urethral swelling or urethral lesion.      Vagina: Normal.      Cervix: Normal.      Uterus: Normal.       Adnexa: Right adnexa normal and left adnexa normal.        Right: No mass, tenderness or fullness.          Left: No mass, tenderness or fullness.        Comments: Pap obtained  Lymphadenopathy:      Upper Body:      Right upper body: No supraclavicular, axillary or pectoral adenopathy.      Left upper body: No supraclavicular, axillary or pectoral adenopathy.      Lower Body: No right inguinal adenopathy. No left inguinal adenopathy.   Skin:     General: Skin is warm and dry.   Neurological:      Mental  Status: She is alert and oriented to person, place, and time.      Gait: Gait is intact.   Psychiatric:         Attention and Perception: Attention and perception normal.         Mood and Affect: Mood and affect normal.         Speech: Speech normal.         Behavior: Behavior normal. Behavior is cooperative.           Assessment/Plan   Diagnoses and all orders for this visit:    1. Well woman exam with routine gynecological exam (Primary)  -     Liquid-based Pap Smear, Screening    Reviewed cervical and breast cancer screening recommendations.

## 2021-03-10 LAB
LAB AP CASE REPORT: NORMAL
PATH INTERP SPEC-IMP: NORMAL

## 2021-05-17 RX ORDER — MEDROXYPROGESTERONE ACETATE 5 MG/1
5 TABLET ORAL DAILY
Qty: 10 TABLET | Refills: 0 | Status: SHIPPED | OUTPATIENT
Start: 2021-05-17 | End: 2022-02-08 | Stop reason: SDUPTHER

## 2021-05-17 NOTE — PROGRESS NOTES
LMP was mid January. Hx of oligomenorrhea and infertility. Recent normal pap testing with [facility]. UPT is negative. Provera menses induction.

## 2021-07-23 ENCOUNTER — TRANSCRIBE ORDERS (OUTPATIENT)
Dept: LAB | Facility: OTHER | Age: 36
End: 2021-07-23

## 2021-07-23 ENCOUNTER — LAB (OUTPATIENT)
Dept: LAB | Facility: OTHER | Age: 36
End: 2021-07-23

## 2021-07-23 DIAGNOSIS — I77.6 VASCULITIS (HCC): Primary | ICD-10-CM

## 2021-07-23 DIAGNOSIS — I77.6 VASCULITIS (HCC): ICD-10-CM

## 2021-07-23 LAB
ALBUMIN SERPL-MCNC: 5 G/DL (ref 3.5–5)
ALBUMIN/GLOB SERPL: 1.4 G/DL (ref 1.1–1.8)
ALP SERPL-CCNC: 120 U/L (ref 38–126)
ALT SERPL W P-5'-P-CCNC: 15 U/L
ANION GAP SERPL CALCULATED.3IONS-SCNC: 9 MMOL/L (ref 5–15)
AST SERPL-CCNC: 24 U/L (ref 14–36)
BACTERIA UR QL AUTO: ABNORMAL /HPF
BASOPHILS # BLD AUTO: 0.01 10*3/MM3 (ref 0–0.2)
BASOPHILS NFR BLD AUTO: 0.1 % (ref 0–1.5)
BILIRUB SERPL-MCNC: 0.3 MG/DL (ref 0.2–1.3)
BILIRUB UR QL STRIP: NEGATIVE
BUN SERPL-MCNC: 15 MG/DL (ref 7–23)
BUN/CREAT SERPL: 17.6 (ref 7–25)
CALCIUM SPEC-SCNC: 9.8 MG/DL (ref 8.4–10.2)
CHLORIDE SERPL-SCNC: 106 MMOL/L (ref 101–112)
CLARITY UR: ABNORMAL
CO2 SERPL-SCNC: 24 MMOL/L (ref 22–30)
COLOR UR: YELLOW
CREAT SERPL-MCNC: 0.85 MG/DL (ref 0.52–1.04)
DEPRECATED RDW RBC AUTO: 40.3 FL (ref 37–54)
EOSINOPHIL # BLD AUTO: 0.37 10*3/MM3 (ref 0–0.4)
EOSINOPHIL NFR BLD AUTO: 4.5 % (ref 0.3–6.2)
ERYTHROCYTE [DISTWIDTH] IN BLOOD BY AUTOMATED COUNT: 12.9 % (ref 12.3–15.4)
ERYTHROCYTE [SEDIMENTATION RATE] IN BLOOD: 6 MM/HR (ref 0–20)
GFR SERPL CREATININE-BSD FRML MDRD: 75 ML/MIN/1.73 (ref 64–149)
GLOBULIN UR ELPH-MCNC: 3.7 GM/DL (ref 2.3–3.5)
GLUCOSE SERPL-MCNC: 102 MG/DL (ref 70–99)
GLUCOSE UR STRIP-MCNC: NEGATIVE MG/DL
HCT VFR BLD AUTO: 44.8 % (ref 34–46.6)
HGB BLD-MCNC: 15.3 G/DL (ref 12–15.9)
HGB UR QL STRIP.AUTO: ABNORMAL
HYALINE CASTS UR QL AUTO: ABNORMAL /LPF
KETONES UR QL STRIP: ABNORMAL
LEUKOCYTE ESTERASE UR QL STRIP.AUTO: ABNORMAL
LYMPHOCYTES # BLD AUTO: 1.62 10*3/MM3 (ref 0.7–3.1)
LYMPHOCYTES NFR BLD AUTO: 19.6 % (ref 19.6–45.3)
MCH RBC QN AUTO: 29.5 PG (ref 26.6–33)
MCHC RBC AUTO-ENTMCNC: 34.2 G/DL (ref 31.5–35.7)
MCV RBC AUTO: 86.5 FL (ref 79–97)
MONOCYTES # BLD AUTO: 0.64 10*3/MM3 (ref 0.1–0.9)
MONOCYTES NFR BLD AUTO: 7.8 % (ref 5–12)
MUCOUS THREADS URNS QL MICRO: ABNORMAL /HPF
NEUTROPHILS NFR BLD AUTO: 5.61 10*3/MM3 (ref 1.7–7)
NEUTROPHILS NFR BLD AUTO: 68 % (ref 42.7–76)
NITRITE UR QL STRIP: NEGATIVE
PH UR STRIP.AUTO: <=5 [PH] (ref 5.5–8)
PLATELET # BLD AUTO: 275 10*3/MM3 (ref 140–450)
PMV BLD AUTO: 9.8 FL (ref 6–12)
POTASSIUM SERPL-SCNC: 4.3 MMOL/L (ref 3.4–5)
PROT SERPL-MCNC: 8.7 G/DL (ref 6.3–8.6)
PROT UR QL STRIP: NEGATIVE
RBC # BLD AUTO: 5.18 10*6/MM3 (ref 3.77–5.28)
RBC # UR: ABNORMAL /HPF
REF LAB TEST METHOD: ABNORMAL
SODIUM SERPL-SCNC: 139 MMOL/L (ref 137–145)
SP GR UR STRIP: >=1.03 (ref 1–1.03)
SQUAMOUS #/AREA URNS HPF: ABNORMAL /HPF
UROBILINOGEN UR QL STRIP: ABNORMAL
WBC # BLD AUTO: 8.25 10*3/MM3 (ref 3.4–10.8)
WBC UR QL AUTO: ABNORMAL /HPF

## 2021-07-23 PROCEDURE — 86666 EHRLICHIA ANTIBODY: CPT | Performed by: NURSE PRACTITIONER

## 2021-07-23 PROCEDURE — 81001 URINALYSIS AUTO W/SCOPE: CPT | Performed by: INTERNAL MEDICINE

## 2021-07-23 PROCEDURE — 36415 COLL VENOUS BLD VENIPUNCTURE: CPT | Performed by: INTERNAL MEDICINE

## 2021-07-23 PROCEDURE — 85651 RBC SED RATE NONAUTOMATED: CPT | Performed by: INTERNAL MEDICINE

## 2021-07-23 PROCEDURE — 86618 LYME DISEASE ANTIBODY: CPT | Performed by: NURSE PRACTITIONER

## 2021-07-23 PROCEDURE — 86757 RICKETTSIA ANTIBODY: CPT | Performed by: NURSE PRACTITIONER

## 2021-07-23 PROCEDURE — 80053 COMPREHEN METABOLIC PANEL: CPT | Performed by: INTERNAL MEDICINE

## 2021-07-23 PROCEDURE — 87205 SMEAR GRAM STAIN: CPT | Performed by: NURSE PRACTITIONER

## 2021-07-23 PROCEDURE — 85025 COMPLETE CBC W/AUTO DIFF WBC: CPT | Performed by: INTERNAL MEDICINE

## 2021-07-23 PROCEDURE — 87070 CULTURE OTHR SPECIMN AEROBIC: CPT | Performed by: NURSE PRACTITIONER

## 2021-07-26 LAB
B BURGDOR IGG+IGM SER-ACNC: <0.91 ISR (ref 0–0.9)
BACTERIA SPEC AEROBE CULT: NORMAL
GRAM STN SPEC: NORMAL
GRAM STN SPEC: NORMAL

## 2021-07-28 LAB
R RICKETTSI IGG SER QL IA: NEGATIVE
R RICKETTSI IGM SER-ACNC: 1.43 INDEX (ref 0–0.89)

## 2021-07-30 LAB
A PHAGOCYTOPH IGG TITR SER IF: NEGATIVE {TITER}
A PHAGOCYTOPH IGM TITR SER IF: NEGATIVE {TITER}
E CHAFFEENSIS IGG TITR SER IF: NEGATIVE {TITER}
E CHAFFEENSIS IGM TITR SER IF: NEGATIVE {TITER}

## 2021-09-03 ENCOUNTER — OFFICE VISIT (OUTPATIENT)
Dept: SURGERY | Facility: CLINIC | Age: 36
End: 2021-09-03

## 2021-09-03 DIAGNOSIS — Z98.890 S/P EXCISION OF FIBROADENOMA OF BREAST: Primary | ICD-10-CM

## 2021-09-03 DIAGNOSIS — Z12.31 SCREENING MAMMOGRAM FOR HIGH-RISK PATIENT: ICD-10-CM

## 2021-09-03 DIAGNOSIS — Z86.018 S/P EXCISION OF FIBROADENOMA OF BREAST: Primary | ICD-10-CM

## 2021-09-03 NOTE — PROGRESS NOTES
You have chosen to receive care through a telephone visit. Do you consent to use a telephone visit for your medical care today? Yes      Spoke with patient by phone per her request.  She is doing well from her breast biopsy no complaints regarding either breast.  She did have bilateral mammograms done which I have reviewed myself and agree with radiology there BI-RADS 2.  Clip appears to be in good position.  Mammotome of the left breast was done with findings of a fibroadenoma.  Patient does not feel anything in her breast.  Patient denies pain in her breast.    We'll obtain a bilateral mammogram in 1 year return to clinic after the study or sooner if she has any other concerns or questions

## 2022-02-08 RX ORDER — MEDROXYPROGESTERONE ACETATE 5 MG/1
5 TABLET ORAL DAILY
Qty: 10 TABLET | Refills: 0 | Status: SHIPPED | OUTPATIENT
Start: 2022-02-08 | End: 2022-02-18

## 2022-02-08 NOTE — PROGRESS NOTES
LMP November. Hx of oligomenorrhea. Works in . Take UPT prior to beginning provera challenge. Pap smear and WWE up to date. MMG scheduled for September.

## 2022-02-12 PROCEDURE — 87635 SARS-COV-2 COVID-19 AMP PRB: CPT | Performed by: EMERGENCY MEDICINE

## 2022-03-22 ENCOUNTER — LAB (OUTPATIENT)
Dept: LAB | Facility: OTHER | Age: 37
End: 2022-03-22

## 2022-03-22 DIAGNOSIS — N91.2 AMENORRHEA: Primary | ICD-10-CM

## 2022-03-22 LAB — HCG SERPL QL: NEGATIVE

## 2022-03-22 PROCEDURE — 82670 ASSAY OF TOTAL ESTRADIOL: CPT | Performed by: NURSE PRACTITIONER

## 2022-03-22 PROCEDURE — 82397 CHEMILUMINESCENT ASSAY: CPT | Performed by: NURSE PRACTITIONER

## 2022-03-22 PROCEDURE — 83001 ASSAY OF GONADOTROPIN (FSH): CPT | Performed by: NURSE PRACTITIONER

## 2022-03-22 PROCEDURE — 36415 COLL VENOUS BLD VENIPUNCTURE: CPT | Performed by: NURSE PRACTITIONER

## 2022-03-22 PROCEDURE — 84439 ASSAY OF FREE THYROXINE: CPT | Performed by: NURSE PRACTITIONER

## 2022-03-22 PROCEDURE — 84703 CHORIONIC GONADOTROPIN ASSAY: CPT | Performed by: NURSE PRACTITIONER

## 2022-03-22 PROCEDURE — 84144 ASSAY OF PROGESTERONE: CPT | Performed by: NURSE PRACTITIONER

## 2022-03-22 PROCEDURE — 84443 ASSAY THYROID STIM HORMONE: CPT | Performed by: NURSE PRACTITIONER

## 2022-03-22 NOTE — PROGRESS NOTES
Pt had amenorrhea since November on 2/8. I sent in a provera challenge. Pt has not had any bleeding after completion. AMH was extremely low several years ago and it was suspected she would go through early menopause. She admits she was having horrible hot flashes and night sweats until taking the provera. They have since calmed down.  Given the failed provera and hx, I do recommend we check labs. Pt to complete today and schedule in office for FU after they are resulted.

## 2022-03-23 LAB
ESTRADIOL SERPL HS-MCNC: 170 PG/ML
FSH SERPL-ACNC: 29.3 MIU/ML
PROGEST SERPL-MCNC: 0.07 NG/ML
T4 FREE SERPL-MCNC: 1.29 NG/DL (ref 0.93–1.7)
TSH SERPL DL<=0.05 MIU/L-ACNC: 1.53 UIU/ML (ref 0.27–4.2)

## 2022-03-29 LAB — MIS SERPL-MCNC: <0.015 NG/ML

## 2022-09-12 ENCOUNTER — OFFICE VISIT (OUTPATIENT)
Dept: SURGERY | Facility: CLINIC | Age: 37
End: 2022-09-12

## 2022-09-12 VITALS
DIASTOLIC BLOOD PRESSURE: 88 MMHG | SYSTOLIC BLOOD PRESSURE: 124 MMHG | BODY MASS INDEX: 24.11 KG/M2 | WEIGHT: 131 LBS | HEIGHT: 62 IN | HEART RATE: 79 BPM | TEMPERATURE: 97.1 F

## 2022-09-12 DIAGNOSIS — Z12.31 SCREENING MAMMOGRAM FOR HIGH-RISK PATIENT: Primary | ICD-10-CM

## 2022-09-12 DIAGNOSIS — D24.2 FIBROADENOMA OF LEFT BREAST: ICD-10-CM

## 2022-09-12 PROCEDURE — 99213 OFFICE O/P EST LOW 20 MIN: CPT | Performed by: SURGERY

## 2022-09-12 NOTE — PROGRESS NOTES
37-year-old female who is now over a year out from left breast mammotome biopsy for fibroadenoma.  Patient is doing well no complaints.  She is pursuing denies any pain palpable mass or nipple discharge in either breast.    Recent mammograms were unremarkable.  Felt to be BI-RADS 2.  Reviewed the mammograms and agree with radiology.  Clip appears to be in good position    Breast appear symmetric there is no skin changes no palpable mass no nipple discharge  No chest wall abnormalities neck unremarkable    Normal breast exam and mammogram.  Went over this with the patient answered all of her questions.  Patient wishes to continue her breast screening with us so we have ordered a mammogram for 1 year from now and she will follow-up with us after the study or return sooner if she has other concerns or questions about either breast.  She understands a fibroadenoma does not increase her risk for any breast cancer or other issues with either breast.

## 2023-09-11 ENCOUNTER — OFFICE VISIT (OUTPATIENT)
Dept: SURGERY | Facility: CLINIC | Age: 38
End: 2023-09-11
Payer: COMMERCIAL

## 2023-09-11 VITALS
DIASTOLIC BLOOD PRESSURE: 70 MMHG | HEART RATE: 80 BPM | SYSTOLIC BLOOD PRESSURE: 110 MMHG | BODY MASS INDEX: 23.46 KG/M2 | HEIGHT: 62 IN | TEMPERATURE: 96.9 F | WEIGHT: 127.5 LBS

## 2023-09-11 DIAGNOSIS — Z12.39 BREAST SCREENING: ICD-10-CM

## 2023-09-11 DIAGNOSIS — Z12.31 SCREENING MAMMOGRAM FOR HIGH-RISK PATIENT: Primary | ICD-10-CM

## 2023-09-11 PROCEDURE — 99213 OFFICE O/P EST LOW 20 MIN: CPT | Performed by: SURGERY

## 2023-09-11 NOTE — PROGRESS NOTES
38-year-old female who is now over a year out from left breast Fortino-Cut needle biopsy found to have a fibroadenoma.  This was initially picked up on patient's screening mammogram that she obtained per her discussion with her primary care provider.  Patient denies any palpable masses nipple discharge or pain in either breast currently.  She has had multiple aunts on her mother's side who have had breast cancer.  No first-degree relatives however and her mother is negative for any breast or ovarian cancer.  Patient had bilateral screening mammogram done and I reviewed that with her here today and we can see where clip was placed and agree with radiology that these are BI-RADS 1 mammogram.  Went over that with the patient answered all of her questions.    Breasts are symmetric there is no palpable masses no nipple discharge no adenopathy  Chest wall is unremarkable  Arms without any swelling    Assessment and plan  Normal exam and mammogram.  Patient will be 39 next year she wishes to continue with her mammograms at this point.  She wishes for us to continue to get her mammograms for her so we have ordered a mammogram and she will follow-up with us after the mammogram next year or sooner if she has any other concerns or questions.

## (undated) DEVICE — SUT VIC 2/0 UR6 27IN VCP602H

## (undated) DEVICE — STERILE POLYISOPRENE POWDER-FREE SURGICAL GLOVES: Brand: PROTEXIS

## (undated) DEVICE — VISUALIZATION SYSTEM: Brand: CLEARIFY

## (undated) DEVICE — PDS II VLT 0 107CM AG ST3: Brand: ENDOLOOP

## (undated) DEVICE — SUT MONOCRYL 4/0 PS2 27IN Y426H ETY426H

## (undated) DEVICE — PK LAP CHOLE LF 60

## (undated) DEVICE — CORE TRUMPET FOR SINGLE SOLUTION BAG: Brand: CORE DYNAMICS

## (undated) DEVICE — GLV SURG SIGNATURE ESSENTIAL PF LTX SZ6.5

## (undated) DEVICE — STERILE POLYISOPRENE POWDER-FREE SURGICAL GLOVES WITH EMOLLIENT COATING: Brand: PROTEXIS

## (undated) DEVICE — ENDOPOUCH RETRIEVER SPECIMEN RETRIEVAL BAGS: Brand: ENDOPOUCH RETRIEVER

## (undated) DEVICE — SYR LUERLOK 30CC

## (undated) DEVICE — THE KUMAR CATHETER®, USED IN CONJUNCTION WITH KUMAR CHOLANGIOGRAPHY® CLAMP, IS MEANT TO PROVIDE A MEANS OF LAPAROSCOPIC CHOLANGIOGRAPHY. IT COMPRISES A TRANSLUCENT TUBING ( 76 CM. LENGTH AND 16 GA. ) THAT CARRIES A 19 GA., 1.25 CM LONG NEEDLE AT THE END. THE KUMAR CATHETER® IS USED TO PUNCTURE THE HARTMANN'S POUCH OF THE GALLBLADDER FOR BILIARY ACCESS AND / OR ASPIRATION. PRODUCT IS LATEX FREE.: Brand: KUMAR CATHETER®

## (undated) DEVICE — GLV SURG SIGNATURE ESSENTIAL PF LTX SZ7

## (undated) DEVICE — ENDOPATH XCEL DILATING TIP TROCARS WITH STABILITY SLEEVES: Brand: ENDOPATH XCEL

## (undated) DEVICE — HARMONIC ACE +7 LAPAROSCOPIC SHEARS ADVANCED HEMOSTASIS 5MM DIAMETER 36CM SHAFT LENGTH  FOR USE WITH GRAY HAND PIECE ONLY: Brand: HARMONIC ACE

## (undated) DEVICE — SKIN AFFIX SURG ADHESIVE 72/CS 0.55ML: Brand: MEDLINE

## (undated) DEVICE — CVR FLUOROSCOPE C/ARM W/TP 36X28IN

## (undated) DEVICE — LUER-LOK 360°: Brand: CONNECTA, LUER-LOK

## (undated) DEVICE — MONOPOLAR METZENBAUM SCISSOR TIP, DISPOSABLE: Brand: MONOPOLAR METZENBAUM SCISSOR TIP, DISPOSABLE

## (undated) DEVICE — SYR LUERLOK 20CC BX/50